# Patient Record
Sex: FEMALE | Race: WHITE | NOT HISPANIC OR LATINO | Employment: FULL TIME | URBAN - METROPOLITAN AREA
[De-identification: names, ages, dates, MRNs, and addresses within clinical notes are randomized per-mention and may not be internally consistent; named-entity substitution may affect disease eponyms.]

---

## 2018-05-02 ENCOUNTER — OFFICE VISIT (OUTPATIENT)
Dept: OPTOMETRY | Facility: CLINIC | Age: 58
End: 2018-05-02
Payer: COMMERCIAL

## 2018-05-02 DIAGNOSIS — H52.223 REGULAR ASTIGMATISM OF BOTH EYES: ICD-10-CM

## 2018-05-02 DIAGNOSIS — Z98.890 HX OF LASIK: ICD-10-CM

## 2018-05-02 DIAGNOSIS — H52.4 PRESBYOPIA: Primary | ICD-10-CM

## 2018-05-02 DIAGNOSIS — H52.03 HYPERMETROPIA OF BOTH EYES: ICD-10-CM

## 2018-05-02 PROCEDURE — 92004 COMPRE OPH EXAM NEW PT 1/>: CPT | Performed by: OPTOMETRIST

## 2018-05-02 PROCEDURE — 92015 DETERMINE REFRACTIVE STATE: CPT | Performed by: OPTOMETRIST

## 2018-05-02 ASSESSMENT — CONF VISUAL FIELD
METHOD: COUNTING FINGERS
OD_NORMAL: 1
OS_NORMAL: 1

## 2018-05-02 ASSESSMENT — VISUAL ACUITY
OD_CC: 20/20
OS_CC: 20/25
OS_SC: 20/40
CORRECTION_TYPE: GLASSES
OS_CC: 20/20
OD_CC: 20/20
METHOD: SNELLEN - LINEAR
OD_SC: 20/25

## 2018-05-02 ASSESSMENT — REFRACTION_MANIFEST
OS_SPHERE: +0.75
OS_CYLINDER: +0.25
OD_ADD: +2.25
OD_AXIS: 040
OD_SPHERE: +0.00
OD_CYLINDER: +0.50
OS_ADD: +2.25
OS_AXIS: 160

## 2018-05-02 ASSESSMENT — TONOMETRY
IOP_METHOD: TONOPEN
OD_IOP_MMHG: 16
OS_IOP_MMHG: 17

## 2018-05-02 ASSESSMENT — CUP TO DISC RATIO
OD_RATIO: 0.3
OS_RATIO: 0.2

## 2018-05-02 ASSESSMENT — REFRACTION_WEARINGRX
OS_AXIS: 072
OD_CYLINDER: -0.25
OS_SPHERE: +0.75
OS_CYLINDER: -0.25
SPECS_TYPE: PAL
OD_AXIS: 022
OD_SPHERE: +0.25

## 2018-05-02 ASSESSMENT — SLIT LAMP EXAM - LIDS
COMMENTS: NORMAL
COMMENTS: NORMAL

## 2018-05-02 ASSESSMENT — EXTERNAL EXAM - RIGHT EYE: OD_EXAM: NORMAL

## 2018-05-02 ASSESSMENT — EXTERNAL EXAM - LEFT EYE: OS_EXAM: NORMAL

## 2018-05-02 NOTE — LETTER
5/2/2018         RE: Bre Bush  [address]  [facility] [address]        Dear Colleague,    Thank you for referring your patient, Bre Bush, to the [facility]. Please see a copy of my visit note below.    Chief Complaint   Patient presents with     COMPREHENSIVE EYE EXAM         Last Eye Exam: [facility] - 5 years  Dilated Previously: Yes    What are you currently using to see?  Glasses - sometimes - if she needs to read something on tv       Distance Vision Acuity: Noticed gradual change in left eye    Near Vision Acuity: Not satisfied  - reaches for cheaters to help - +2.50    Eye Comfort: good  Do you use eye drops? : No  Occupation or Hobbies: Nurse at the [employer]    Sailaja Townsend  Optometric Tech            Medical, surgical and family histories reviewed and updated 5/2/2018.       OBJECTIVE: See Ophthalmology exam    ASSESSMENT:    ICD-10-CM    1. Presbyopia H52.4 REFRACTION   2. Regular astigmatism of both eyes H52.223 REFRACTION   3. 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 Hx of LASIK Z98.890 EYE EXAM (SIMPLE-NONBILLABLE)      PLAN:     Patient Instructions   Recommend new glasses.  Work space glasses are an option.    Discussed contact lens wear.  Monovision left eye lens only or multifocal.  Will call if interested in cl fit.    Return in 1 year for a complete eye exam or sooner if needed.    Tyler Miller, MARKY           Again, thank you for allowing me to participate in the care of your patient.        Sincerely,        Tyler Miller, OD

## 2018-05-02 NOTE — PATIENT INSTRUCTIONS
Recommend new glasses.  Work space glasses are an option.    Discussed contact lens wear.  Monovision left eye lens only or multifocal.  Will call if interested in cl fit.    Return in 1 year for a complete eye exam or sooner if needed.    Tyler Miller, MARKY      The affects of the dilating drops last for 4- 6 hours.  You will be more sensitive to light and vision will be blurry up close.  Mydriatic sunglasses were given if needed.      Optometry Providers       Clinic Locations                                 Telephone Number   Dr. Margoth Aquino [facility]   [place] [phone]     [facility] Hours:                [facility] Hours:       [facility] Hours:   [address]   [address]     [address] [facility] [place] [address]   [place] [address]    [place] [address]  Phone: [phone]                    Phone: [phone]     Phone: [phone]                      Monday 8:00-7:00                          Monday 8:00-7:00                          Monday 8:00-7:00              Tuesday 8:00-6:00                          Tuesday 8:00-7:00                          Tuesday 8:00-7:00              Wednesday 8:00-6:00                  Wednesday 8:00-7:00                   Wednesday 8:00-7:00      Thursday 8:00-6:00                        Thursday 8:00-7:00                         Thursday 8:00-7:00            Friday 8:00-5:00                              Friday 8:00-5:00                              Friday 8:00-5:00    [facility] Hours:   [address] [place], [place] [address] [phone]    Monday 8:00-7:00  Tuesday 8:00-7:00  Wednesday 8:00-7:00  Thursday 8:00-7:00  Friday 8:00-5:00  Please log on to [email or web address] to order your contact lenses.  The link is found on the [facility] Care [facility] page.  As always, Thank you for trusting us with  your health care needs!

## 2018-05-02 NOTE — MR AVS SNAPSHOT
After Visit Summary   5/2/2018    Bre Bush    MRN: [id number]           Patient Information     Date Of Birth          1960        Visit Information        Provider Department      5/2/2018 9:00 AM Tyler Miller OD [facility]        Today's Diagnoses     Presbyopia    -  1    Regular astigmatism of both eyes        Hypermetropia of both eyes        Hx of LASIK          Care Instructions    Recommend new glasses.  Work space glasses are an option.    Discussed contact lens wear.  Monovision left eye lens only or multifocal.  Will call if interested in cl fit.    Return in 1 year for a complete eye exam or sooner if needed.    Tyler Miller, MARKY      The affects of the dilating drops last for 4- 6 hours.  You will be more sensitive to light and vision will be blurry up close.  Mydriatic sunglasses were given if needed.      Optometry Providers       Clinic Locations                                 Telephone Number   Dr. Margoth Aquino [place]   [facility] [phone]     [facility] Hours:                [place] [place] [facility] Hours:       [facility] Hours:   [address]   [address]     [address] [place] [address]   [place] [address]    [place] [address]  Phone: [phone]                    Phone: [phone]     Phone: [phone]                      Monday 8:00-7:00                          Monday 8:00-7:00                          Monday 8:00-7:00              Tuesday 8:00-6:00                          Tuesday 8:00-7:00                          Tuesday 8:00-7:00              Wednesday 8:00-6:00                  Wednesday 8:00-7:00                   Wednesday 8:00-7:00      Thursday 8:00-6:00                        Thursday 8:00-7:00                         Thursday 8:00-7:00            Friday 8:00-5:00                               Friday 8:00-5:00                              Friday 8:00-5:00    [facility] Hours:   [address] [place] [address]  [phone]    Monday 8:00-7:00  Tuesday 8:00-7:00  Wednesday 8:00-7:00  Thursday 8:00-7:00  Friday 8:00-5:00  Please log on to [email or web address] to order your contact lenses.  The link is found on the Eye Care and Vision Services page.  As always, Thank you for trusting us with your health care needs!            Follow-ups after your visit        Follow-up notes from your care team     Return in about 1 year (around 5/2/2019) for Annual Visit.      Who to contact     If you have questions or need follow up information about today's clinic visit or your schedule please contact [facility] directly at [phone].  Normal or non-critical lab and imaging results will be communicated to you by MyChart, letter or phone within 4 business days after the clinic has received the results. If you do not hear from us within 7 days, please contact the clinic through YeHive[employer] or phone. If you have a critical or abnormal lab result, we will notify you by phone as soon as possible.  Submit refill requests through [email or web address] or call your pharmacy and they will forward the refill request to us. Please allow 3 business days for your refill to be completed.          Additional Information About Your Visit        Ye[employer]ive[employer] Information     [email or web address] is an electronic gateway that provides easy, online access to your medical records. With [email or web address], you can request a clinic appointment, read your test results, renew a prescription or communicate with your care team.     To sign up for [email or web address] visit the website at [email or web address]   You will be asked to enter the access code listed below, as well as some personal information. Please follow the directions to create your username and password.     Your access code is: [email or web address]  Expires: 7/31/2018 10:50 AM      Your access code will  in 90 days. If you need help or a new code, please contact your [facility] or call [phone] for assistance.        Care EveryWhere ID     This is your Care EveryWhere ID. This could be used by other organizations to access your [facility] medical records  [id number]         Blood Pressure from Last 3 Encounters:   No data found for BP    Weight from Last 3 Encounters:   No data found for Wt              We Performed the Following     EYE EXAM (SIMPLE-NONBILLABLE)     REFRACTION        Primary Care Provider Office Phone # Fax #    Ha MD Suman [phone]       [address]  [facility] [address]        Equal Access to Services     IVETTE EVERETT : Hadii zach escalona hadasho Somaximoali, waaxda luqadaha, qaybta kaalmada adeegyada, zeb sandoval. So [facility] [phone].    ATENCIÓN: Si habla español, tiene a marte disposición servicios gratuitos de asistencia lingüística. Llame al [phone].    We comply with applicable federal civil rights laws and [place] laws. We do not discriminate on the basis of race, color, national origin, age, disability, sex, sexual orientation, or gender identity.            Thank you!     Thank you for choosing [facility]  for your care. Our goal is always to provide you with excellent care. Hearing back from our patients is one way we can continue to improve our services. Please take a few minutes to complete the written survey that you may receive in the mail after your visit with us. Thank you!             Your Updated Medication List - Protect others around you: Learn how to safely use, store and throw away your medicines at www.disposemymeds.org.          This list is accurate as of 18 10:50 AM.  Always use your most recent med list.                   Brand Name Dispense Instructions for use Diagnosis     AMBIEN PO           PREVACID PO           SYNTHROID PO

## 2018-05-02 NOTE — PROGRESS NOTES
Chief Complaint   Patient presents with     COMPREHENSIVE EYE EXAM         Last Eye Exam: [facility]ano - 5 years  Dilated Previously: Yes    What are you currently using to see?  Glasses - sometimes - if she needs to read something on tv       Distance Vision Acuity: Noticed gradual change in left eye    Near Vision Acuity: Not satisfied  - reaches for cheaters to help - +2.50    Eye Comfort: good  Do you use eye drops? 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       OBJECTIVE: See Ophthalmology exam    ASSESSMENT:    ICD-10-CM    1. Presbyopia H52.4 REFRACTION   2. Regular astigmatism of both eyes H52.223 REFRACTION   3. 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 Hx of LASIK Z98.890 EYE EXAM (SIMPLE-NONBILLABLE)      PLAN:     Patient Instructions   Recommend new glasses.  Work space glasses are an option.    Discussed contact lens wear.  Monovision left eye lens only or multifocal.  Will call if interested in cl fit.    Return in 1 year for a complete eye exam or sooner if needed.    Tyler Miller, OD

## 2020-03-12 ENCOUNTER — THERAPY VISIT (OUTPATIENT)
Dept: OCCUPATIONAL THERAPY | Facility: CLINIC | Age: 60
End: 2020-03-12
Payer: COMMERCIAL

## 2020-03-12 DIAGNOSIS — M65.4 RADIAL STYLOID TENOSYNOVITIS (DE QUERVAIN): ICD-10-CM

## 2020-03-12 DIAGNOSIS — M79.644 BILATERAL THUMB PAIN: ICD-10-CM

## 2020-03-12 DIAGNOSIS — M79.645 BILATERAL THUMB PAIN: ICD-10-CM

## 2020-03-12 PROCEDURE — 97760 ORTHOTIC MGMT&TRAING 1ST ENC: CPT | Mod: GO | Performed by: OCCUPATIONAL THERAPIST

## 2020-03-12 PROCEDURE — 97110 THERAPEUTIC EXERCISES: CPT | Mod: GO | Performed by: OCCUPATIONAL THERAPIST

## 2020-03-12 PROCEDURE — 97165 OT EVAL LOW COMPLEX 30 MIN: CPT | Mod: GO | Performed by: OCCUPATIONAL THERAPIST

## 2020-03-12 NOTE — LETTER
[facility]  [address]  [place]   [facility] [address]  [phone]    2020    Re: Bre Bush   :   1960  MRN:  [id number]   REFERRING PHYSICIAN:   Ha Will    [facility]  Date of Initial Evaluation:  3/12/20  Visits:  Rxs Used: 1  Reason for Referral:     Bilateral thumb pain  Radial styloid tenosynovitis (de quervain)    EVALUATION SUMMARY  Hand Therapy Initial Evaluation  Current Date:  3/12/2020  Referring Physician: Dr. Will  Diagnosis: Bilateral Dequervains (R) greater than (L)  DOI: 20 (Date of order)    Subjective:  Bre Bush is a 60 year old right hand dominant female.    Patient reports symptoms of pain, stiffness/loss of motion, weakness/loss of strength, edema, numbness and tingling  of the bilateral hands/wrists which occurred due to unknown etiology. Since onset symptoms are Gradually getting worse.  Special tests:  x-ray.  Previous treatment: none.    General health as reported by patient is good.  Pertinent medical history includes:Thyroid Problems, reflux  Medical allergies:none.  Surgical history: orthopedic: (R) knee, (L) toe.  Medication history: Sleep, Thyroid, Prevacid.    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  Job Tasks: Computer Work, Prolonged Standing, Repetitive Tasks  Prior functional level:  no limitations  Barriers include:none  Mobility: No difficulty  Transportation: drives  Leisure activities/hobbies: golf    Upper Extremity Functional Index Score:  SCORE:   Column Totals: /80: 52   (A lower score indicates greater disability.)    Objective:  Pain Level (Scale 0-10):   3/12/2020   At Rest 4-5/10   With Use 7/10     Re: Bre Bush   :   1960    Pain Description:  Date 3/12/2020   Location hand and thumb   Pain Quality Aching, throbbing and Sharp   Frequency intermittent     Pain is worst  daytime   Exacerbated by   gripping, twisting, pinching, pulling, lifting   Relieved by NSAIDs and rest   Progression Gradually worsening     Sensation   Decreased Median Nerve distribution per pt report.  Patient reports numbness in the thumbs    Edema   - none  + mild    ++ moderate    +++ severe    3/12/2020   1st DC -   Radial Styloid -      ROM  Pain Report: - none  + mild    ++ moderate    +++ severe   Thumb   3/12/2020 3/12/2020   AROM  (PROM) Right Left   MP 60 53   IP 60 60   RABD 45+ 50+   PABD 45+ 50+   Opposition 7/10+++ 7/10++     Wrist 3/12/2020 3/12/2020   AROM (PROM) Right Left   Extension 70+ 75   Flexion 65+ 65+   RD 20+ 25   UD 45+ 45   UD with Th Flex 20++ 25+     Resisted Testing  Pain Report: - none  + mild    ++ moderate    +++ severe   Right 3/12/2020   APL ++   EPB +++   EPL +++   FCR ++   Radial Deviation ++   Ulnar Deviation +       Re: Bre Bush   :   1960    Resisted Testing  Pain Report: - none  + mild    ++ moderate    +++ severe   Left 3/12/2020   APL +   EPB +   EPL +   FCR +   Radial Deviation ++   Ulnar Deviation +         Strength   (Measured in pounds)  Pain Report: - none  + mild    ++ moderate    +++ severe    3/12/2020 3/12/2020   Trials Right Left   1  2  3 63++ 60++   Average       Lat Pinch 3/12/2020 3/12/2020   Trials Right Left   1  2  3 15+++ 19+   Average       3 Pt Pinch 3/12/2020 3/12/2020   Trials Right Left   1  2  3 6+++ 15+   Average       Special Tests   Pain Report:  - none    + mild    ++ moderate    +++ severe   Right 3/12/2020   Finkelsteins +++   Radial Nerve Tinel's (DRSN) -   Th CMC Grind ++   Th CMC Passive Retropulsion +++   ULTT Radial Nerve                      Re: Bre Bush   :   1960    Special Tests   Pain Report:  - none    + mild    ++ moderate    +++ severe   Left 3/12/2020   Finkelsteins +   Radial Nerve Tinel's (DRSN) +   Th CMC Grind +   Th CMC Passive Retropulsion ++   ULTT Radial Nerve      Palpation  Pain Report:  - none    + mild     ++ moderate    +++ severe   Right 3/12/2020   Radial Styloid +++   1st DC +++   FCR ++   Thumb CMC +++   PIN Site ++   Extensor Wad      Palpation  Pain Report:  - none    + mild    ++ moderate    +++ severe   Left 3/12/2020   Radial Styloid +++   1st DC +++   FCR ++   Thumb CMC ++   PIN Site ++   Extensor Wad -     Thumb Observation/Appearance  Key: + = present/ - = not observed    3/12/2020   Shoulder deformity present over CMC R:-  L:-   Volar subluxation present R:+  L:+   Edema over the CMC joint R:+  L:+   Noted collapse of MP into hyperextension during pinch R:-  L:+   Tenderness at CMC R:+++  L:++                          Re: Bre Bush   :   1960    Provocative Tests  Pain Report:  - none    + mild    ++ moderate    +++ severe     3/12/2020   CMC Grind test R:-  L:-   CMC Joint line/pain R:++  L:++   CMC AP joint laxity R:-  L: -   Crepitus present R:-  L:-       Assessment:  Patient presents with symptoms consistent with diagnosis of CMC thumb arthritis and Dequervains, with conservative intervention.    Patient s limitations or Problem List includes:  Pain, Decreased ROM/motion, weakness, decreased stability of the CMC joint, tightness in the musculature decreased  and pinch strength of the thumb which interferes with patients ability to perform  self care, dressing, home maintenance, work and sports/recreational as compared to previous level of function.    Rehab Potential:  Good - Return to full activity, some limitations    Patient will benefit from skilled Occupational Therapy to increase ROM, flexibility, overall strength,  strength, pinch strength and stability of thumb and decrease pain to return to previous activity level and resume normal daily tasks and to reach their rehab potential.    Barriers to Learning:  No barrier    Communication Issues:  Patient appears to be able to clearly communicate and understand verbal and written communication and follow directions  correctly.    Chart Review: Brief history including review of medical and/or therapy records relating to the presenting problem and Simple history review with patient    Identified Performance Deficits: dressing, home establishment and management, meal preparation and cleanup, work and leisure activities    Assessment of Occupational Performance:  5 or more Performance Deficits    Clinical Decision Making (Complexity): Low complexity    Treatment Explanation:  The following has been discussed with the patient:  RX ordered/plan of care  Anticipated outcomes  Possible risks and side effects    Plan:  Frequency:  1 X week, once daily  Duration:  for 8 weeks    Treatment Plan:  Modalities:  Paraffin, US  Therapeutic Exercise: AROM, Isometrics, and Stabilization exercises of the Thumb CMC, including active and resisted abduction, 1st DI strengthening  Manual Techniques: Joint Mobilization or reseating of the trapezium, self MFR to thumb adductor with clip, MFR to 1st dorsal compartment,Friction massage over 1st dorsal compartment  Re: Bre Bush   :   1960    Neuromuscular: Radial nerve gliding, Kevyn taping  Orthosis fabrication:  Forearm based thumb spica, Hand based Thumb Spica, Custom neoprene support   Education: Anatomy of CMC, joint protection principles, adaptive equipment as needed    Discharge Plan:  Achieve all LTG  [place] in home treatment program.  Reach maximal therapeutic benefit.    Home Program:  Warmth  Ice after activity for pain  Self TFM to 1st dorsal compartment  Self MFR to EPB/ABL  AROM of wrist and thumb  PROM with stretch into simultaneous thumb flexion and ulnar deviation  Thumb spica orthosis for sleeping and per symptoms day  Avoid activities that exacerbate pain in the wrist or thumb    Next Visit:  US?  1st web release with clip  Self CMC mobilization on chest  Place and hold pinch  Thumb Stabilization Program with hard  C  and index abd  Hand based Thumb Spica orthosis  night/sleeping and per symptoms during the day   CMC neoprene cool comfort orthosis during the day with ADL s per symptoms  Incorporate joint protection into daily functional activities  Adaptive equipment as needed.      Thank you for your referral.    INQUIRIES  Therapist: CAMERON Correa/L, T   [facility]  [address]  [place] [address]  [facility] [address]  Phone: [phone]  Fax: [phone]

## 2020-03-12 NOTE — PROGRESS NOTES
Hand Therapy Initial Evaluation    Current Date:  3/12/2020  Referring Physician: Dr. Will    Diagnosis: Bilateral Dequervains (R) greater than (L)  DOI: 2/28/20 (Date of order)    Subjective:  Bre Bush is a 60 year old right hand dominant female.    Patient reports symptoms of pain, stiffness/loss of motion, weakness/loss of strength, edema, numbness and tingling  of the bilateral hands/wrists which occurred due to unknown etiology. Since onset symptoms are Gradually getting worse.  Special tests:  x-ray.  Previous treatment: none.    General health as reported by patient is good.  Pertinent medical history includes:Thyroid Problems, reflux  Medical allergies:none.  Surgical history: orthopedic: (R) knee, (L) toe.  Medication history: Sleep, Thyroid, Prevacid.    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  Job Tasks: Computer Work, Prolonged Standing, Repetitive Tasks  Prior functional level:  no limitations  Barriers include:none  Mobility: No difficulty  Transportation: drives  Leisure activities/hobbies: [employer]    Upper Extremity Functional Index Score:  SCORE:   Column Totals: /80: 52   (A lower score indicates greater disability.)    Objective:  Pain Level (Scale 0-10):   3/12/2020   At Rest 4-5/10   With Use 7/10     Pain Description:  Date 3/12/2020   Location hand and thumb   Pain Quality Aching, throbbing and Sharp   Frequency intermittent     Pain is worst  daytime   Exacerbated by  gripping, twisting, pinching, pulling, lifting   Relieved by NSAIDs and rest   Progression Gradually worsening     Sensation   Decreased Median Nerve distribution per pt report.  Patient reports numbness in the thumbs    Edema   - none  + mild    ++ moderate    +++ severe    3/12/2020   1st DC -   Radial Styloid -      ROM  Pain Report: - none  + mild    ++ moderate    +++ severe   Thumb   3/12/2020 3/12/2020   AROM  (PROM) Right Left   MP 60 53    IP 60 60   RABD 45+ 50+   PABD 45+ 50+   Opposition 7/10+++ 7/10++     Wrist 3/12/2020 3/12/2020   AROM (PROM) Right Left   Extension 70+ 75   Flexion 65+ 65+   RD 20+ 25   UD 45+ 45   UD with Th Flex 20++ 25+       Resisted Testing  Pain Report: - none  + mild    ++ moderate    +++ severe   Right 3/12/2020   APL ++   EPB +++   EPL +++   FCR ++   Radial Deviation ++   Ulnar Deviation +         Resisted Testing  Pain Report: - none  + mild    ++ moderate    +++ severe   Left 3/12/2020   APL +   EPB +   EPL +   FCR +   Radial Deviation ++   Ulnar Deviation +         Strength   (Measured in pounds)  Pain Report: - none  + mild    ++ moderate    +++ severe    3/12/2020 3/12/2020   Trials Right Left   1  2  3 63++ 60++   Average       Lat Pinch 3/12/2020 3/12/2020   Trials Right Left   1  2  3 15+++ 19+   Average       3 Pt Pinch 3/12/2020 3/12/2020   Trials Right Left   1  2  3 6+++ 15+   Average       Special Tests   Pain Report:  - none    + mild    ++ moderate    +++ severe   Right 3/12/2020   Finkelsteins +++   Radial Nerve Tinel's (DRSN) -   Th CMC Grind ++   Th CMC Passive Retropulsion +++   ULTT Radial Nerve      Special Tests   Pain Report:  - none    + mild    ++ moderate    +++ severe   Left 3/12/2020   Finkelsteins +   Radial Nerve Tinel's (DRSN) +   Th CMC Grind +   Th CMC Passive Retropulsion ++   ULTT Radial Nerve      Palpation  Pain Report:  - none    + mild    ++ moderate    +++ severe   Right 3/12/2020   Radial Styloid +++   1st DC +++   FCR ++   Thumb CMC +++   PIN Site ++   Extensor Wad      Palpation  Pain Report:  - none    + mild    ++ moderate    +++ severe   Left 3/12/2020   Radial Styloid +++   1st DC +++   FCR ++   Thumb CMC ++   PIN Site ++   Extensor Wad -       Thumb Observation/Appearance  Key: + = present/ - = not observed    3/12/2020   Shoulder deformity present over CMC R:-  L:-   Volar subluxation present R:+  L:+   Edema over the CMC joint R:+  L:+   Noted collapse of MP into  hyperextension during pinch R:-  L:+   Tenderness at CMC R:+++  L:++      Provocative Tests  Pain Report:  - none    + mild    ++ moderate    +++ severe     3/12/2020   CMC Grind test R:-  L:-   CMC Joint line/pain R:++  L:++   CMC AP joint laxity R:-  L: -   Crepitus present R:-  L:-       Assessment:  Patient presents with symptoms consistent with diagnosis of CMC thumb arthritis and Dequervains, with conservative intervention.    Patient s limitations or Problem List includes:  Pain, Decreased ROM/motion, weakness, decreased stability of the CMC joint, tightness in the musculature decreased  and pinch strength of the thumb which interferes with patients ability to perform  self care, dressing, home maintenance, work and sports/recreational as compared to previous level of function.    Rehab Potential:  Good - Return to full activity, some limitations    Patient will benefit from skilled Occupational Therapy to increase ROM, flexibility, overall strength,  strength, pinch strength and stability of thumb and decrease pain to return to previous activity level and resume normal daily tasks and to reach their rehab potential.    Barriers to Learning:  No barrier    Communication Issues:  Patient appears to be able to clearly communicate and understand verbal and written communication and follow directions correctly.    Chart Review: Brief history including review of medical and/or therapy records relating to the presenting problem and Simple history review with patient    Identified Performance Deficits: dressing, home establishment and management, meal preparation and cleanup, work and leisure activities    Assessment of Occupational Performance:  5 or more Performance Deficits    Clinical Decision Making (Complexity): Low complexity    Treatment Explanation:  The following has been discussed with the patient:  RX ordered/plan of care  Anticipated outcomes  Possible risks and side effects    Plan:  Frequency:   1 X week, once daily  Duration:  for 8 weeks    Treatment Plan:  Modalities:  Paraffin, US  Therapeutic Exercise: AROM, Isometrics, and Stabilization exercises of the Thumb CMC, including active and resisted abduction, 1st DI strengthening  Manual Techniques: Joint Mobilization or reseating of the trapezium, self MFR to thumb adductor with clip, MFR to 1st dorsal compartment,Friction massage over 1st dorsal compartment  Neuromuscular: Radial nerve gliding, Kevyn taping  Orthosis fabrication:  Forearm based thumb spica, Hand based Thumb Spica, Custom neoprene support   Education: Anatomy of CMC, joint protection principles, adaptive equipment as needed    Discharge Plan:  Achieve all LTG  [place] in home treatment program.  Reach maximal therapeutic benefit.    Home Program:  Warmth  Ice after activity for pain  Self TFM to 1st dorsal compartment  Self MFR to EPB/ABL  AROM of wrist and thumb  PROM with stretch into simultaneous thumb flexion and ulnar deviation  Thumb spica orthosis for sleeping and per symptoms day  Avoid activities that exacerbate pain in the wrist or thumb    Next Visit:  US?  1st web release with clip  Self CMC mobilization on chest  Place and hold pinch  Thumb Stabilization Program with hard  C  and index abd  Hand based Thumb Spica orthosis night/sleeping and per symptoms during the day   CMC neoprene cool comfort orthosis during the day with ADL s per symptoms  Incorporate joint protection into daily functional activities  Adaptive equipment as needed.

## 2020-03-17 ENCOUNTER — THERAPY VISIT (OUTPATIENT)
Dept: OCCUPATIONAL THERAPY | Facility: CLINIC | Age: 60
End: 2020-03-17
Payer: COMMERCIAL

## 2020-03-17 DIAGNOSIS — M79.645 BILATERAL THUMB PAIN: ICD-10-CM

## 2020-03-17 DIAGNOSIS — M65.4 RADIAL STYLOID TENOSYNOVITIS (DE QUERVAIN): ICD-10-CM

## 2020-03-17 DIAGNOSIS — M79.644 BILATERAL THUMB PAIN: ICD-10-CM

## 2020-03-17 PROCEDURE — 97110 THERAPEUTIC EXERCISES: CPT | Mod: GO | Performed by: OCCUPATIONAL THERAPIST

## 2020-03-17 PROCEDURE — 97035 APP MDLTY 1+ULTRASOUND EA 15: CPT | Mod: GO | Performed by: OCCUPATIONAL THERAPIST

## 2020-03-17 PROCEDURE — 97112 NEUROMUSCULAR REEDUCATION: CPT | Mod: GO | Performed by: OCCUPATIONAL THERAPIST

## 2020-03-17 PROCEDURE — 97140 MANUAL THERAPY 1/> REGIONS: CPT | Mod: GO | Performed by: OCCUPATIONAL THERAPIST

## 2020-04-09 ENCOUNTER — TELEPHONE (OUTPATIENT)
Dept: OCCUPATIONAL THERAPY | Facility: CLINIC | Age: 60
End: 2020-04-09

## 2020-07-13 PROBLEM — M79.645 BILATERAL THUMB PAIN: Status: RESOLVED | Noted: 2020-03-12 | Resolved: 2020-07-13

## 2020-07-13 PROBLEM — M65.4 RADIAL STYLOID TENOSYNOVITIS (DE QUERVAIN): Status: RESOLVED | Noted: 2020-03-12 | Resolved: 2020-07-13

## 2020-07-13 PROBLEM — M79.644 BILATERAL THUMB PAIN: Status: RESOLVED | Noted: 2020-03-12 | Resolved: 2020-07-13

## 2020-07-13 NOTE — PROGRESS NOTES
Discharge Summary - Hand Therapy    Patient did not return to therapy.  Please refer to the last progress/SOAP note for objective details and goal achievement.  We will assume that patient's goals were met.    D/C from [facility].

## 2020-10-01 ENCOUNTER — OFFICE VISIT (OUTPATIENT)
Dept: ORTHOPEDICS | Facility: CLINIC | Age: 60
End: 2020-10-01
Payer: COMMERCIAL

## 2020-10-01 VITALS
HEART RATE: 78 BPM | HEIGHT: 65 IN | BODY MASS INDEX: 34.69 KG/M2 | WEIGHT: 208.2 LBS | OXYGEN SATURATION: 98 % | SYSTOLIC BLOOD PRESSURE: 160 MMHG | DIASTOLIC BLOOD PRESSURE: 96 MMHG

## 2020-10-01 DIAGNOSIS — M65.4 DE QUERVAIN'S DISEASE (RADIAL STYLOID TENOSYNOVITIS): Primary | ICD-10-CM

## 2020-10-01 PROCEDURE — 20550 NJX 1 TENDON SHEATH/LIGAMENT: CPT | Mod: RT | Performed by: FAMILY MEDICINE

## 2020-10-01 PROCEDURE — 99207 PR DROP WITH A PROCEDURE: CPT | Performed by: FAMILY MEDICINE

## 2020-10-01 RX ORDER — METHYLPREDNISOLONE ACETATE 40 MG/ML
40 INJECTION, SUSPENSION INTRA-ARTICULAR; INTRALESIONAL; INTRAMUSCULAR; SOFT TISSUE
Status: DISCONTINUED | OUTPATIENT
Start: 2020-10-01 | End: 2021-02-26

## 2020-10-01 RX ADMIN — METHYLPREDNISOLONE ACETATE 40 MG: 40 INJECTION, SUSPENSION INTRA-ARTICULAR; INTRALESIONAL; INTRAMUSCULAR; SOFT TISSUE at 13:57

## 2020-10-01 ASSESSMENT — PAIN SCALES - GENERAL: PAINLEVEL: EXTREME PAIN (8)

## 2020-10-01 ASSESSMENT — MIFFLIN-ST. JEOR: SCORE: 1503.39

## 2020-10-01 NOTE — LETTER
"    10/1/2020         RE: Bre Bush  [address]  [facility] [address]        Dear Colleague,    Thank you for referring your patient, Bre Bush, to the [facility] [place]. Please see a copy of my visit note below.    Hand / Upper Extremity Injection/Arthrocentesis: R extensor compartment 1    Date/Time: 10/1/2020 1:57 PM  Performed by: Stas Cedeno DO  Authorized by: Stas Cedeno DO     Indications:  Pain  Needle Size:  22 G  Guidance: ultrasound    Condition: de Quervain's      Site:  R extensor compartment 1  Medications:  40 mg methylPREDNISolone 40 MG/ML  Outcome:  Tolerated well, no immediate complications  Procedure discussed: discussed risks, benefits, and alternatives    Consent Given by:  Patient  Timeout: timeout called immediately prior to procedure    Prep: patient was prepped and draped in usual sterile fashion       Wrist Injection, Dorsal First Compartment - Ultrasound Guided    The patient was informed of the risks and the benefits of the procedure and a written consent was signed.    The patient s right wrist was prepped with chlorhexidine in sterile fashion.   40 mg of methylprednisolone suspension was drawn up into a 3 mL syringe with 1.0 mL of 1% lidocaine.  Injection was performed using sterile technique.  Under ultrasound guidance a 1.5\" 22-gauge needle was used to enter the right wrist at the dorsal first compartment.  Needle placement was visualized and documented with ultrasound.  Ultrasound visualization required to ensure injection material enters the tendon sheath and not the tendon itself.  Injection performed long axis to the probe.  Injection solution visualized within the tendon sheath.  Images were permanently stored for the patient's record.  There were no complications. The patient tolerated the procedure well. There was negligible bleeding.   The patient was instructed to ice the wrist upon leaving clinic and " "refrain from overuse over the next 3 days.   The patient was instructed to call or go to the emergency room with any unusual pain, swelling, redness, or if otherwise concerned.          CHIEF COMPLAINT:  New Patient (Procedure injection- pain in both thumbs)       HISTORY OF PRESENT ILLNESS  Ms. Bush is a pleasant 60 year old female who presents to clinic today with wrist pain.  Bre has had pain in both of her wrist for some time, possibly years.  While there was no single inciting event, she does suspect that this is the cumulative effect of years using her hands.  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  Her pain is now affecting her ADLs as well.  She has tried analgesics, hand therapy, and a custom brace.      Additional history: as documented    MEDICAL HISTORY  Patient Active Problem List   Diagnosis     Knee pain     Other postprocedural status(V45.89)       Current Outpatient Medications   Medication Sig Dispense Refill     Lansoprazole (PREVACID PO)        Levothyroxine Sodium (SYNTHROID PO)        Zolpidem Tartrate (AMBIEN PO)          No Known Allergies    Family History   Problem Relation Age of Onset     Glaucoma No family hx of      Macular Degeneration No family hx of        Additional medical/Social/Surgical histories reviewed in [facility] and updated as appropriate.        PHYSICAL EXAM    Vitals:    10/01/20 1330   BP: (!) 160/96   BP Location: Right arm   Patient Position: Sitting   Cuff Size: Adult Regular   Pulse: 78   SpO2: 98%   Weight: 94.4 kg (208 lb 3.2 oz)   Height: 1.64 m (5' 4.57\")     General  - normal appearance, in no obvious distress  HEENT  - conjunctivae not injected, moist mucous membranes  CV  - normal radial pulse  Pulm  - normal respiratory pattern, non-labored  Musculoskeletal - right wrist  - inspection: no atrophy, normal joint alignment, no swelling  - palpation: " mild tenderness over the radiocarpal joint, no bony or soft tissue tenderness, no tenderness at the anatomical snuffbox  - ROM:  90 deg flexion   70 deg extension   25 deg abduction   65 deg adduction  - strength: 5/5  strength, 5/5 wrist abduction, 5/5 flexion, extension, pronation, supination, adduction  - special tests:  (-) Tinel's  (+) Finkelstein  Neuro  - no numbness, no motor deficit, grossly normal coordination, normal muscle tone  Skin  - no ecchymosis, erythema, warmth, or induration, no obvious rash  Psych  - interactive, appropriate, normal mood and affect         ASSESSMENT & PLAN  Ms. Bush is a 60 year old female who presents to clinic today with bilateral wrist pain.    Her symptoms do seem more than likely secondary to deQuervain's.    Through shared decision making we did decide to inject her wrist today (see procedure note).  I do suggest Bre try Voltaren gel and continue exercises and her brace for comfort.    If this injection does not help I would consider ordering an MR.    It was a pleasure seeing Bre today.    Stas Cedeno DO, [facility]  Primary Care Sports Medicine      This note was constructed using Dragon dictation software, please excuse any minor errors in spelling, grammar, or syntax.          Again, thank you for allowing me to participate in the care of your patient.        Sincerely,        Stas Cedeno DO

## 2020-10-01 NOTE — PROGRESS NOTES
"Hand / Upper Extremity Injection/Arthrocentesis: R extensor compartment 1    Date/Time: 10/1/2020 1:57 PM  Performed by: Stas Cedeno DO  Authorized by: Stas Cedeno DO     Indications:  Pain  Needle Size:  22 G  Guidance: ultrasound    Condition: de Quervain's      Site:  R extensor compartment 1  Medications:  40 mg methylPREDNISolone 40 MG/ML  Outcome:  Tolerated well, no immediate complications  Procedure discussed: discussed risks, benefits, and alternatives    Consent Given by:  Patient  Timeout: timeout called immediately prior to procedure    Prep: patient was prepped and draped in usual sterile fashion       Wrist Injection, Dorsal First Compartment - Ultrasound Guided    The patient was informed of the risks and the benefits of the procedure and a written consent was signed.    The patient s right wrist was prepped with chlorhexidine in sterile fashion.   40 mg of methylprednisolone suspension was drawn up into a 3 mL syringe with 1.0 mL of 1% lidocaine.  Injection was performed using sterile technique.  Under ultrasound guidance a 1.5\" 22-gauge needle was used to enter the right wrist at the dorsal first compartment.  Needle placement was visualized and documented with ultrasound.  Ultrasound visualization required to ensure injection material enters the tendon sheath and not the tendon itself.  Injection performed long axis to the probe.  Injection solution visualized within the tendon sheath.  Images were permanently stored for the patient's record.  There were no complications. The patient tolerated the procedure well. There was negligible bleeding.   The patient was instructed to ice the wrist upon leaving clinic and refrain from overuse over the next 3 days.   The patient was instructed to call or go to the emergency room with any unusual pain, swelling, redness, or if otherwise concerned.          CHIEF COMPLAINT:  New Patient (Procedure injection- pain in both thumbs)       HISTORY OF " "PRESENT ILLNESS  Ms. Bush is a pleasant 60 year old female who presents to clinic today with wrist pain.  Bre has had pain in both of her wrist for some time, possibly years.  While there was no single inciting event, she does suspect that this is the cumulative effect of years using her hands.  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  Her pain is now affecting her ADLs as well.  She has tried analgesics, hand therapy, and a custom brace.      Additional history: as documented    MEDICAL HISTORY  Patient Active Problem List   Diagnosis     Knee pain     Other postprocedural status(V45.89)       Current Outpatient Medications   Medication Sig Dispense Refill     Lansoprazole (PREVACID PO)        Levothyroxine Sodium (SYNTHROID PO)        Zolpidem Tartrate (AMBIEN PO)          No Known Allergies    Family History   Problem Relation Age of Onset     Glaucoma No family hx of      Macular Degeneration No family hx of        Additional medical/Social/Surgical histories reviewed in [facility] and updated as appropriate.        PHYSICAL EXAM    Vitals:    10/01/20 1330   BP: (!) 160/96   BP Location: Right arm   Patient Position: Sitting   Cuff Size: Adult Regular   Pulse: 78   SpO2: 98%   Weight: 94.4 kg (208 lb 3.2 oz)   Height: 1.64 m (5' 4.57\")     General  - normal appearance, in no obvious distress  HEENT  - conjunctivae not injected, moist mucous membranes  CV  - normal radial pulse  Pulm  - normal respiratory pattern, non-labored  Musculoskeletal - right wrist  - inspection: no atrophy, normal joint alignment, no swelling  - palpation: mild tenderness over the radiocarpal joint, no bony or soft tissue tenderness, no tenderness at the anatomical snuffbox  - ROM:  90 deg flexion   70 deg extension   25 deg abduction   65 deg adduction  - strength: 5/5  strength, 5/5 wrist abduction, 5/5 flexion, extension, " pronation, supination, adduction  - special tests:  (-) Tinel's  (+) Finkelstein  Neuro  - no numbness, no motor deficit, grossly normal coordination, normal muscle tone  Skin  - no ecchymosis, erythema, warmth, or induration, no obvious rash  Psych  - interactive, appropriate, normal mood and affect         ASSESSMENT & PLAN  Ms. Bush is a 60 year old female who presents to clinic today with bilateral wrist pain.    Her symptoms do seem more than likely secondary to deQuervain's.    Through shared decision making we did decide to inject her wrist today (see procedure note).  I do suggest Bre try Voltaren gel and continue exercises and her brace for comfort.    If this injection does not help I would consider ordering an MR.    It was a pleasure seeing Bre today.    Stas Cedeno DO, [facility]  Primary Care Sports Medicine      This note was constructed using Dragon dictation software, please excuse any minor errors in spelling, grammar, or syntax.

## 2020-10-01 NOTE — PATIENT INSTRUCTIONS
Thanks for coming today.  Ortho/Sports Medicine Clinic  [address] [place] [address]    To schedule future appointments in Ortho Clinic, you may call [phone].    To schedule ordered imaging by your provider:   Call Central Imaging [phone]    To schedule an injection ordered by your provider:  Call Central Imaging Injection scheduling line: [phone]  [facility] available online at:  [email or web address]    Please call if any further questions or concerns ([phone]).  Clinic hours 8 am to 5 pm.    Return to clinic (call) if symptoms worsen or fail to improve.

## 2020-10-01 NOTE — NURSING NOTE
"Bre Bush's chief complaint for this visit includes:  Chief Complaint   Patient presents with     New Patient     Procedure injection- pain in both thumbs     PCP: Ha Will    Referring Provider:  No referring provider defined for this encounter.    BP (!) 160/96 (BP Location: Right arm, Patient Position: Sitting, Cuff Size: Adult Regular)   Pulse 78   Ht 1.64 m (5' 4.57\")   Wt 94.4 kg (208 lb 3.2 oz)   SpO2 98%   BMI 35.11 kg/m    Extreme Pain (8)     Do you need any medication refills at today's visit? No      "

## 2020-10-15 ENCOUNTER — OFFICE VISIT (OUTPATIENT)
Dept: ORTHOPEDICS | Facility: CLINIC | Age: 60
End: 2020-10-15
Payer: COMMERCIAL

## 2020-10-15 VITALS — WEIGHT: 208 LBS | BODY MASS INDEX: 35.08 KG/M2

## 2020-10-15 DIAGNOSIS — M65.4 DE QUERVAIN'S DISEASE (RADIAL STYLOID TENOSYNOVITIS): Primary | ICD-10-CM

## 2020-10-15 PROCEDURE — 99207 PR DROP WITH A PROCEDURE: CPT | Performed by: FAMILY MEDICINE

## 2020-10-15 PROCEDURE — 20550 NJX 1 TENDON SHEATH/LIGAMENT: CPT | Mod: LT | Performed by: FAMILY MEDICINE

## 2020-10-15 RX ORDER — TRIAMCINOLONE ACETONIDE 40 MG/ML
40 INJECTION, SUSPENSION INTRA-ARTICULAR; INTRAMUSCULAR
Status: DISCONTINUED | OUTPATIENT
Start: 2020-10-15 | End: 2021-02-26

## 2020-10-15 RX ADMIN — TRIAMCINOLONE ACETONIDE 40 MG: 40 INJECTION, SUSPENSION INTRA-ARTICULAR; INTRAMUSCULAR at 08:14

## 2020-10-15 NOTE — PROGRESS NOTES
Bre is here for a left dorsal 1st compartment injection.    Scribed by Daysi Moncada ATC for Dr. Cedeno on 10/15/20 at 8:20 AM, based on the providers statements to me.       [facility] FOLLOW-UP VISIT 10/15/2020    Bre Bush's chief complaint for this visit includes:  Chief Complaint   Patient presents with     RECHECK     f/u right first doral compartment injection, doing about 50% better, here for the left first dorsal compartment.      PCP: Ha Will    Referring Provider:  No referring provider defined for this encounter.    Wt 94.3 kg (208 lb)   BMI 35.08 kg/m    Data Unavailable       Interval History:     Follow up reason: right first dorsal compartment injection doing about 50% better, here today for the left.     Following Therapeutic Plan: Yes     Pain: Improving    Function: Improving    Medical History:    Any recent changes to your medical history? No    Any new medication prescribed since last visit? No    Review of Systems:    Do you have fever, chills, weight loss? No    Do you have any vision problems? No    Do you have any chest pain or edema? No    Do you have any shortness of breath or wheezing?  No    Do you have stomach problems? No    Do you have any urinary track issues? No    Do you have any numbness or focal weakness? No    Do you have diabetes? No    Do you have problems with bleeding or clotting? No    Do you have an rashes or other skin lesions? No    Hand / Upper Extremity Injection/Arthrocentesis: L extensor compartment 1    Date/Time: 10/15/2020 8:14 AM  Performed by: Stas Cedeno DO  Authorized by: Stas Cedeno DO     Indications:  Pain  Needle Size:  22 G  Guidance: ultrasound    Condition: de Quervain's      Site:  L extensor compartment 1  Medications:  40 mg triamcinolone 40 MG/ML  Outcome:  Tolerated well, no immediate complications  Procedure discussed: discussed risks, benefits, and alternatives    Consent Given by:  Patient  Timeout:  "timeout called immediately prior to procedure    Prep: patient was prepped and draped in usual sterile fashion       Wrist Injection, Dorsal First Compartment - Ultrasound Guided    The patient was informed of the risks and the benefits of the procedure and a written consent was signed.    The patient s left wrist was prepped with chlorhexidine in sterile fashion.   40 mg of methylprednisolone suspension was drawn up into a 3 mL syringe with 1.0 mL of 1% lidocaine.  Injection was performed using sterile technique.  Under ultrasound guidance a 1.5\" 22-gauge needle was used to enter the left wrist at the dorsal first compartment.  Needle placement was visualized and documented with ultrasound.  Ultrasound visualization required to ensure injection material enters the tendon sheath and not the tendon itself.  Injection performed long axis to the probe.  Injection solution visualized within the tendon sheath.  Images were permanently stored for the patient's record.  There were no complications. The patient tolerated the procedure well. There was negligible bleeding.   The patient was instructed to ice the wrist upon leaving clinic and refrain from overuse over the next 3 days.   The patient was instructed to call or go to the emergency room with any unusual pain, swelling, redness, or if otherwise concerned.           "

## 2020-10-15 NOTE — LETTER
10/15/2020         RE: Bre Bush  [address]  [facility] [address]        Dear Colleague,    Thank you for referring your patient, Bre Bush, to the [facility] [place]. Please see a copy of my visit note below.    Bre is here for a left dorsal 1st compartment injection.    Scribed by Daysi Moncada ATC for Dr. Cedeno on 10/15/20 at 8:20 AM, based on the providers statements to me.       [facility] FOLLOW-UP VISIT 10/15/2020    Bre Bush's chief complaint for this visit includes:  Chief Complaint   Patient presents with     RECHECK     f/u right first doral compartment injection, doing about 50% better, here for the left first dorsal compartment.      PCP: Ha Will    Referring Provider:  No referring provider defined for this encounter.    Wt 94.3 kg (208 lb)   BMI 35.08 kg/m    Data Unavailable       Interval History:     Follow up reason: right first dorsal compartment injection doing about 50% better, here today for the left.     Following Therapeutic Plan: Yes     Pain: Improving    Function: Improving    Medical History:    Any recent changes to your medical history? No    Any new medication prescribed since last visit? No    Review of Systems:    Do you have fever, chills, weight loss? No    Do you have any vision problems? No    Do you have any chest pain or edema? No    Do you have any shortness of breath or wheezing?  No    Do you have stomach problems? No    Do you have any urinary track issues? No    Do you have any numbness or focal weakness? No    Do you have diabetes? No    Do you have problems with bleeding or clotting? No    Do you have an rashes or other skin lesions? No    Hand / Upper Extremity Injection/Arthrocentesis: L extensor compartment 1    Date/Time: 10/15/2020 8:14 AM  Performed by: Stas Cedeno DO  Authorized by: Stas Cedeno DO     Indications:  Pain  Needle Size:  22 G  Guidance:  "ultrasound    Condition: de Quervain's      Site:  L extensor compartment 1  Medications:  40 mg triamcinolone 40 MG/ML  Outcome:  Tolerated well, no immediate complications  Procedure discussed: discussed risks, benefits, and alternatives    Consent Given by:  Patient  Timeout: timeout called immediately prior to procedure    Prep: patient was prepped and draped in usual sterile fashion       Wrist Injection, Dorsal First Compartment - Ultrasound Guided    The patient was informed of the risks and the benefits of the procedure and a written consent was signed.    The patient s left wrist was prepped with chlorhexidine in sterile fashion.   40 mg of methylprednisolone suspension was drawn up into a 3 mL syringe with 1.0 mL of 1% lidocaine.  Injection was performed using sterile technique.  Under ultrasound guidance a 1.5\" 22-gauge needle was used to enter the left wrist at the dorsal first compartment.  Needle placement was visualized and documented with ultrasound.  Ultrasound visualization required to ensure injection material enters the tendon sheath and not the tendon itself.  Injection performed long axis to the probe.  Injection solution visualized within the tendon sheath.  Images were permanently stored for the patient's record.  There were no complications. The patient tolerated the procedure well. There was negligible bleeding.   The patient was instructed to ice the wrist upon leaving clinic and refrain from overuse over the next 3 days.   The patient was instructed to call or go to the emergency room with any unusual pain, swelling, redness, or if otherwise concerned.               Again, thank you for allowing me to participate in the care of your patient.        Sincerely,        Stas Cedeno DO    "

## 2020-11-25 ENCOUNTER — ANCILLARY PROCEDURE (OUTPATIENT)
Dept: MRI IMAGING | Facility: CLINIC | Age: 60
End: 2020-11-25
Attending: FAMILY MEDICINE
Payer: COMMERCIAL

## 2020-11-25 DIAGNOSIS — M65.4 DE QUERVAIN'S DISEASE (RADIAL STYLOID TENOSYNOVITIS): ICD-10-CM

## 2020-11-25 PROCEDURE — 73221 MRI JOINT UPR EXTREM W/O DYE: CPT | Mod: RT | Performed by: RADIOLOGY

## 2020-11-25 PROCEDURE — 73221 MRI JOINT UPR EXTREM W/O DYE: CPT | Mod: LT | Performed by: RADIOLOGY

## 2020-12-01 ENCOUNTER — MYC MEDICAL ADVICE (OUTPATIENT)
Dept: ORTHOPEDICS | Facility: CLINIC | Age: 60
End: 2020-12-01

## 2020-12-01 DIAGNOSIS — M65.4 DE QUERVAIN'S DISEASE (RADIAL STYLOID TENOSYNOVITIS): Primary | ICD-10-CM

## 2020-12-02 NOTE — TELEPHONE ENCOUNTER
12/2 Called and spoke to patient. She is currently scheduled with Dr. Matthew.     Debbie Aceves   Procedure    Ortho/Sports Med/Pod/Ent/Eye  [facility]   [phone]

## 2020-12-02 NOTE — TELEPHONE ENCOUNTER
RECORDS RECEIVED FROM: bilateral hands referred by Dr. Cedeno   DATE RECEIVED: Jan 5, 2021     NOTES STATUS DETAILS   OFFICE NOTE from referring provider Internal    OFFICE NOTE from other specialist N/A    DISCHARGE SUMMARY from hospital N/A    DISCHARGE REPORT from the ER N/A    OPERATIVE REPORT N/A    MEDICATION LIST Internal    IMPLANT RECORD/STICKER N/A    LABS     CBC/DIFF N/A    CULTURES N/A    INJECTIONS DONE IN RADIOLOGY N/A    MRI Internal    CT SCAN N/A    XRAYS (IMAGES & REPORTS) N/A    TUMOR     PATHOLOGY  Slides & report N/A    12/02/20   12:53 PM   Complete  Sadia Almanza, REKHA

## 2021-01-02 ASSESSMENT — ENCOUNTER SYMPTOMS
STIFFNESS: 1
ARTHRALGIAS: 1
BACK PAIN: 0
MYALGIAS: 0
JOINT SWELLING: 0
MUSCLE WEAKNESS: 0
NECK PAIN: 0
MUSCLE CRAMPS: 0

## 2021-01-05 ENCOUNTER — PRE VISIT (OUTPATIENT)
Dept: ORTHOPEDICS | Facility: CLINIC | Age: 61
End: 2021-01-05

## 2021-01-05 ENCOUNTER — OFFICE VISIT (OUTPATIENT)
Dept: ORTHOPEDICS | Facility: CLINIC | Age: 61
End: 2021-01-05
Payer: COMMERCIAL

## 2021-01-05 ENCOUNTER — HOSPITAL ENCOUNTER (OUTPATIENT)
Facility: AMBULATORY SURGERY CENTER | Age: 61
End: 2021-01-05
Attending: PLASTIC SURGERY
Payer: COMMERCIAL

## 2021-01-05 DIAGNOSIS — M18.0 ARTHRITIS OF CARPOMETACARPAL (CMC) JOINTS OF BOTH THUMBS: Primary | ICD-10-CM

## 2021-01-05 DIAGNOSIS — M65.4 DE QUERVAIN'S DISEASE (RADIAL STYLOID TENOSYNOVITIS): ICD-10-CM

## 2021-01-05 DIAGNOSIS — M18.0 ARTHRITIS OF CARPOMETACARPAL (CMC) JOINTS OF BOTH THUMBS: ICD-10-CM

## 2021-01-05 PROCEDURE — 99203 OFFICE O/P NEW LOW 30 MIN: CPT | Performed by: PLASTIC SURGERY

## 2021-01-05 RX ORDER — CEFAZOLIN SODIUM 1 G/50ML
1 INJECTION, SOLUTION INTRAVENOUS SEE ADMIN INSTRUCTIONS
Status: CANCELLED | OUTPATIENT
Start: 2021-01-05

## 2021-01-05 RX ORDER — CEFAZOLIN SODIUM 2 G/50ML
2 SOLUTION INTRAVENOUS
Status: CANCELLED | OUTPATIENT
Start: 2021-01-05

## 2021-01-05 NOTE — LETTER
2021         RE: Bre Bush  [address]  [facility] [address]        Dear Colleague,    Thank you for referring your patient, Bre Bush, to the [facility] [place]. Please see a copy of my visit note below.    REFERRING PROVIDER: Stas Cedeno    REASON FOR CONSULTATION: Bilateral de Quervain's tendinitis.    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 Stas Cedeno for evaluation and possible surgical management of bilateral de Quervain's tendinitis.  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  She has noticed radial sided wrist pain throughout her career, but it acutely worsened over the last 1 year.  She has been seeing Dr. Cedeno for this and has also seen hand therapy.  She has tried splinting, exercises, and a steroid injection to bilateral wrists about 2 to 3 months ago in the region of the first dorsal compartment which was ultrasound-guided.  Her symptoms recurred within weeks after these injections.  Her symptoms are now affecting her daily and affecting her work as well.  Her right side is worse than the left.  Pain is sharp in nature and radiates proximally.    MEDS:   Prior to Admission medications    Medication Sig Start Date End Date Taking? Authorizing Provider   Lansoprazole (PREVACID PO)    Yes Reported, Patient   Levothyroxine Sodium (SYNTHROID PO)    Yes Reported, Patient   Zolpidem Tartrate (AMBIEN PO)    Yes Reported, Patient       ALLERGIES: No Known Allergies    PMH:   Past Medical History:   Diagnosis Date     Arthritis        PSH:   Past Surgical History:   Procedure Laterality Date     ENHANCE LASER REFRACTIVE BILATERAL EXISTING PT IN PARAMETERS Bilateral            SH:   Social History     Tobacco Use     Smoking status: Never Smoker     Smokeless tobacco: Never Used   Substance Use Topics     Alcohol use: Not on file       FH:   Family History    Problem Relation Age of Onset     Glaucoma No family hx of      Macular Degeneration No family hx of        ROS: Denies chest pain, shortness of breath, diabetes, MI, CVA, DVT, PE, and bleeding disorders.    PHYSICAL EXAMINATION:   General: No acute distress.  On examination of bilateral wrists, exam findings are quite similar.  There is crepitus with thumb grind test that is also very tender.  Palpation over the first dorsal compartment at the distal wrist is also very tender and boggy.  Finkelstein maneuver also exquisitely tender bilaterally.  Patient has skin color changes over the first dorsal compartments where previous steroid injections were performed.  Shah maneuver is only minimally tender.  Radiocarpal joint loading is only minimally tender.  DRUJ stable.  Intersection syndrome area is nontender.  Radial tunnel is nontender.    IMAGING: Bilateral wrist MRIs were reviewed.  This showed evidence for bilateral thumb CMC arthritis and first dorsal compartment tenosynovitis.    ASSESSMENT: Bilateral thumb CMC arthritis and de Quervain's tendinitis, right worse than left.    PLAN: We will work on transferring x-rays from [facility] for review.  This will likely support the diagnosis of bilateral thumb CMC arthritis.  Given patient has failed conservative treatment, we discussed her surgical options to include trapeziectomy with FCR LRTI and open release of first dorsal compartment as well as doing nothing.  I went over the surgical details for this outpatient 2.5 hour surgery.  I explained to her the risks to include bleeding, infection, injury to surrounding structures, chronic stiffness, chronic pain, nerve injury, translocation of tendons, subsidence, recurrence, wound healing difficulties, and need for revision surgery.  Patient accepts the associated risks and wishes to proceed with surgery.  She would like to do this under sedation with a regional block.  She will require a preoperative  H&P.  Following surgery, she will be immobilized in a thumb spica short arm splint for 4 weeks, then 6 to 8 weeks of hand therapy.  All questions were answered.    Total time spent with patient was 30 min of which greater than 50% was in counseling.    Answers for HPI/ROS submitted by the patient on 1/2/2021   General Symptoms: No  Skin Symptoms: No  HENT Symptoms: No  EYE SYMPTOMS: No  HEART SYMPTOMS: No  LUNG SYMPTOMS: No  INTESTINAL SYMPTOMS: No  URINARY SYMPTOMS: No  GYNECOLOGIC SYMPTOMS: No  BREAST SYMPTOMS: No  SKELETAL SYMPTOMS: Yes  BLOOD SYMPTOMS: No  NERVOUS SYSTEM SYMPTOMS: No  MENTAL HEALTH SYMPTOMS: No  Back pain: No  Muscle aches: No  Neck pain: No  Swollen joints: No  Joint pain: Yes  Bone pain: No  Muscle cramps: No  Muscle weakness: No  Joint stiffness: Yes  Bone fracture: No

## 2021-01-05 NOTE — NURSING NOTE
Reason For Visit:   Chief Complaint   Patient presents with     Consult For     Dequervian's bilateral       Primary MD: Ha Will    ?  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  Work status?  Full time..  Date of surgery: NA  Type of surgery: NA.  Smoker: No  Request smoking cessation information: No      There were no vitals taken for this visit.      Pain Assessment  Patient Currently in Pain: Yes  0-10 Pain Scale: 8  Primary Pain Location: Hand(Bilateral - right is worse than left)               QuickDASH Assessment  QuickDASH Main 5/5/2015   1. Open a tight or new jar. 2   2. Do heavy household chores (e.g., wash walls, floors). 2   3. Carry a shopping bag or briefcase. 2   4. Wash your back. 2   5. Use a knife to cut food. 1   6. Recreational activities in which you take some force or impact through your arm, shoulder or hand (e.g., golf, hammering, tennis, etc.). 2   7. During the past week, to what extent has your arm, shoulder or hand problem interfered with your normal social activities with family, friends, neighbours or groups? 2   8. During the past week, were you limited in your work or other regular daily activities as a result of your arm, shoulder or hand problem? 2   9. Arm, shoulder or hand pain. 2   10. Tingling (pins and needles) in your arm,shoulder or hand. 1   11. During the past week, how much difficulty have you had sleeping because of the pain in your arm, shoulder or hand? ([place] number) 3   SUM: 21          No Known Allergies    Tg Barr ATC

## 2021-01-05 NOTE — PROGRESS NOTES
REFERRING PROVIDER: Stas Cedeno    REASON FOR CONSULTATION: Bilateral de Quervain's tendinitis.    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 Stas Cedeno for evaluation and possible surgical management of bilateral de Quervain's tendinitis.  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  She has noticed radial sided wrist pain throughout her career, but it acutely worsened over the last 1 year.  She has been seeing Dr. Cedeno for this and has also seen hand therapy.  She has tried splinting, exercises, and a steroid injection to bilateral wrists about 2 to 3 months ago in the region of the first dorsal compartment which was ultrasound-guided.  Her symptoms recurred within weeks after these injections.  Her symptoms are now affecting her daily and affecting her work as well.  Her right side is worse than the left.  Pain is sharp in nature and radiates proximally.    MEDS:   Prior to Admission medications    Medication Sig Start Date End Date Taking? Authorizing Provider   Lansoprazole (PREVACID PO)    Yes Reported, Patient   Levothyroxine Sodium (SYNTHROID PO)    Yes Reported, Patient   Zolpidem Tartrate (AMBIEN PO)    Yes Reported, Patient       ALLERGIES: No Known Allergies    PMH:   Past Medical History:   Diagnosis Date     Arthritis        PSH:   Past Surgical History:   Procedure Laterality Date     ENHANCE LASER REFRACTIVE BILATERAL EXISTING PT IN PARAMETERS Bilateral            SH:   Social History     Tobacco Use     Smoking status: Never Smoker     Smokeless tobacco: Never Used   Substance Use Topics     Alcohol use: Not on file       FH:   Family History   Problem Relation Age of Onset     Glaucoma No family hx of      Macular Degeneration No family hx of        ROS: Denies chest pain, shortness of breath, diabetes, MI, CVA, DVT, PE, and bleeding disorders.    PHYSICAL EXAMINATION:   General: No acute distress.  On  examination of bilateral wrists, exam findings are quite similar.  There is crepitus with thumb grind test that is also very tender.  Palpation over the first dorsal compartment at the distal wrist is also very tender and boggy.  Finkelstein maneuver also exquisitely tender bilaterally.  Patient has skin color changes over the first dorsal compartments where previous steroid injections were performed.  Shah maneuver is only minimally tender.  Radiocarpal joint loading is only minimally tender.  DRUJ stable.  Intersection syndrome area is nontender.  Radial tunnel is nontender.    IMAGING: Bilateral wrist MRIs were reviewed.  This showed evidence for bilateral thumb CMC arthritis and first dorsal compartment tenosynovitis.    ASSESSMENT: Bilateral thumb CMC arthritis and de Quervain's tendinitis, right worse than left.    PLAN: We will work on transferring x-rays from [facility] for review.  This will likely support the diagnosis of bilateral thumb CMC arthritis.  Given patient has failed conservative treatment, we discussed her surgical options to include trapeziectomy with FCR LRTI and open release of first dorsal compartment as well as doing nothing.  I went over the surgical details for this outpatient 2.5 hour surgery.  I explained to her the risks to include bleeding, infection, injury to surrounding structures, chronic stiffness, chronic pain, nerve injury, translocation of tendons, subsidence, recurrence, wound healing difficulties, and need for revision surgery.  Patient accepts the associated risks and wishes to proceed with surgery.  She would like to do this under sedation with a regional block.  She will require a preoperative H&P.  Following surgery, she will be immobilized in a thumb spica short arm splint for 4 weeks, then 6 to 8 weeks of hand therapy.  All questions were answered.    Total time spent with patient was 30 min of which greater than 50% was in counseling.    Answers for  HPI/ROS submitted by the patient on 1/2/2021   General Symptoms: No  Skin Symptoms: No  HENT Symptoms: No  EYE SYMPTOMS: No  HEART SYMPTOMS: No  LUNG SYMPTOMS: No  INTESTINAL SYMPTOMS: No  URINARY SYMPTOMS: No  GYNECOLOGIC SYMPTOMS: No  BREAST SYMPTOMS: No  SKELETAL SYMPTOMS: Yes  BLOOD SYMPTOMS: No  NERVOUS SYSTEM SYMPTOMS: No  MENTAL HEALTH SYMPTOMS: No  Back pain: No  Muscle aches: No  Neck pain: No  Swollen joints: No  Joint pain: Yes  Bone pain: No  Muscle cramps: No  Muscle weakness: No  Joint stiffness: Yes  Bone fracture: No

## 2021-01-06 ENCOUNTER — TELEPHONE (OUTPATIENT)
Dept: ORTHOPEDICS | Facility: CLINIC | Age: 61
End: 2021-01-06

## 2021-01-06 NOTE — TELEPHONE ENCOUNTER
Teaching Flowsheet   Relevant Diagnosis: De Quuervain's disease (radial styloid tenosynovitis).  Arthritis of carpometacarpal joints of both thumbs.    Teaching Topic: Right thumb trapeziectomy and arthroplasty, right first dorsal compartment release.    CSC under MAC with Regional block anesthesia with Dr Jon Matthew  BMI 35.08     Discussed need for Covid testing to be collected and resulted within 4 days of her surgery.  She is possibly going to try to coordinate this with her work and when she gets her immunization but if this does not work she has the Covid number to schedule a test through [employer].     She will need a pre-op H&P within 30 days of her surgery date.     She was already given a teaching packet in clinic yesterday.     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    Person(s) involved in teaching:   Patient     Motivation Level:  Asks Questions: Yes  Eager to Learn: Yes  Cooperative: Yes  Receptive (willing/able to accept information): Yes  Any cultural factors/[employer]ondoism beliefs that may influence understanding or compliance? No    Patient demonstrates understanding of the following:  Reason for the appointment, diagnosis and treatment plan: Yes  Knowledge of proper use of medications and conditions for which they are ordered (with special attention to potential side effects or drug interactions): Yes  Which situations necessitate calling provider and whom to contact: Yes    Teaching Concerns Addressed:   Proper use and care of  (medical equip, care aids, etc.): Yes  Nutritional needs and diet plan: Yes  Pain management techniques: Yes  Wound Care: Yes  How and/when to access community resources: Yes     Instructional Materials Used/Given: Preoperative teaching packet was given to patient in clinic yesterday.  Mailed general post operative care information today.  Patient will  Hibiclens at work.   Patient will contact RN with any further questions or concerns. Margoth Ritchie RN

## 2021-01-07 ENCOUNTER — TELEPHONE (OUTPATIENT)
Dept: ORTHOPEDICS | Facility: CLINIC | Age: 61
End: 2021-01-07

## 2021-01-07 NOTE — TELEPHONE ENCOUNTER
Patient left voice mail stating she is aware of the requirement for Covid testing within 4 days of surgery, and she wonders if it is required to be vaccinated for Covid prior to surgery.  OK to leave a voice mail.     10:28 AM  Voice mail left for patient that Covid vaccination is not required prior to surgery.   Margoth Ritchie RN

## 2021-01-10 ENCOUNTER — HEALTH MAINTENANCE LETTER (OUTPATIENT)
Age: 61
End: 2021-01-10

## 2021-01-20 ENCOUNTER — MEDICAL CORRESPONDENCE (OUTPATIENT)
Dept: HEALTH INFORMATION MANAGEMENT | Facility: CLINIC | Age: 61
End: 2021-01-20

## 2021-02-14 DIAGNOSIS — Z11.59 ENCOUNTER FOR SCREENING FOR OTHER VIRAL DISEASES: Primary | ICD-10-CM

## 2021-02-15 ENCOUNTER — TELEPHONE (OUTPATIENT)
Dept: ORTHOPEDICS | Facility: CLINIC | Age: 61
End: 2021-02-15

## 2021-02-15 NOTE — TELEPHONE ENCOUNTER
Called and spoke with patient at request of Dr Matthew's  to confirm surgery date is indeed set for 3-1-21.     Surgery scheduler will still need to assist setting Covid test date and time and post operative visits.  Patient has her pre-op H&P this week.    Patient states the [facility] paperwork from last week never made it to her employer.  Printed forms again from [employer], faxed to [phone] and also emailed them to [email or web address].     Short term disability forms completed and awaiting Dr Matthew's signature tomorrow. Margoth Ritchie RN

## 2021-02-16 ENCOUNTER — TELEPHONE (OUTPATIENT)
Dept: PLASTIC SURGERY | Facility: CLINIC | Age: 61
End: 2021-02-16

## 2021-02-16 NOTE — TELEPHONE ENCOUNTER
I contacted the patient via phone and spoke with her to confirm the scheduled dates and provide the following information:     Surgery is scheduled with Dr. Matthew on 3/1 at [facility].  Scheduled per surgeon    H&P: to be completed by PCP.    COVID-19 test scheduled for 2/26    POST-OP: 3/9    They are aware to arrive at 10:00 AM.    The surgery packet was provided via letter in the mail per patient request.

## 2021-02-18 ENCOUNTER — TELEPHONE (OUTPATIENT)
Dept: PLASTIC SURGERY | Facility: CLINIC | Age: 61
End: 2021-02-18

## 2021-02-18 NOTE — TELEPHONE ENCOUNTER
Called patient and left a detailed voicemail explaining surgery location changed from [facility] to [place]. Explained Dr. Matthew has other cases on 3/1 at [facility] so it will be easier for surgeons schedule. Also explained new arrival time will be 11:00 AM and surgery start time is 1:00 PM. Explained that writer will send a [employer] message with this information as well and if patient has questions/concerns, they can call writer back or respond on [employer]. Provided call back number. RN working on [facility] paperwork with patient has also been updated on location change.

## 2021-02-25 ENCOUNTER — DOCUMENTATION ONLY (OUTPATIENT)
Dept: CARE COORDINATION | Facility: CLINIC | Age: 61
End: 2021-02-25

## 2021-02-26 ENCOUNTER — ANESTHESIA EVENT (OUTPATIENT)
Dept: SURGERY | Facility: CLINIC | Age: 61
End: 2021-02-26
Payer: COMMERCIAL

## 2021-02-26 DIAGNOSIS — Z11.59 ENCOUNTER FOR SCREENING FOR OTHER VIRAL DISEASES: ICD-10-CM

## 2021-02-26 LAB
LABORATORY COMMENT REPORT: NORMAL
SARS-COV-2 RNA RESP QL NAA+PROBE: NEGATIVE
SARS-COV-2 RNA RESP QL NAA+PROBE: NORMAL
SPECIMEN SOURCE: NORMAL
SPECIMEN SOURCE: NORMAL

## 2021-02-26 PROCEDURE — U0005 INFEC AGEN DETEC AMPLI PROBE: HCPCS | Performed by: PLASTIC SURGERY

## 2021-02-26 PROCEDURE — U0003 INFECTIOUS AGENT DETECTION BY NUCLEIC ACID (DNA OR RNA); SEVERE ACUTE RESPIRATORY SYNDROME CORONAVIRUS 2 (SARS-COV-2) (CORONAVIRUS DISEASE [COVID-19]), AMPLIFIED PROBE TECHNIQUE, MAKING USE OF HIGH THROUGHPUT TECHNOLOGIES AS DESCRIBED BY CMS-2020-01-R: HCPCS | Performed by: PLASTIC SURGERY

## 2021-02-26 SDOH — HEALTH STABILITY: MENTAL HEALTH: HOW OFTEN DO YOU HAVE A DRINK CONTAINING ALCOHOL?: NEVER

## 2021-03-01 ENCOUNTER — APPOINTMENT (OUTPATIENT)
Dept: GENERAL RADIOLOGY | Facility: CLINIC | Age: 61
End: 2021-03-01
Attending: PLASTIC SURGERY
Payer: COMMERCIAL

## 2021-03-01 ENCOUNTER — HOSPITAL ENCOUNTER (OUTPATIENT)
Facility: CLINIC | Age: 61
Discharge: HOME | End: 2021-03-01
Attending: PLASTIC SURGERY | Admitting: PLASTIC SURGERY
Payer: COMMERCIAL

## 2021-03-01 ENCOUNTER — ANCILLARY PROCEDURE (OUTPATIENT)
Dept: ULTRASOUND IMAGING | Facility: CLINIC | Age: 61
End: 2021-03-01

## 2021-03-01 ENCOUNTER — ANESTHESIA (OUTPATIENT)
Dept: SURGERY | Facility: CLINIC | Age: 61
End: 2021-03-01
Payer: COMMERCIAL

## 2021-03-01 VITALS
TEMPERATURE: 98.1 F | DIASTOLIC BLOOD PRESSURE: 78 MMHG | BODY MASS INDEX: 36.45 KG/M2 | OXYGEN SATURATION: 93 % | WEIGHT: 205.69 LBS | HEIGHT: 63 IN | HEART RATE: 79 BPM | SYSTOLIC BLOOD PRESSURE: 145 MMHG | RESPIRATION RATE: 18 BRPM

## 2021-03-01 DIAGNOSIS — M18.0 ARTHRITIS OF CARPOMETACARPAL (CMC) JOINTS OF BOTH THUMBS: Primary | ICD-10-CM

## 2021-03-01 DIAGNOSIS — M65.4 DE QUERVAIN'S DISEASE (RADIAL STYLOID TENOSYNOVITIS): ICD-10-CM

## 2021-03-01 LAB
ANION GAP SERPL CALCULATED.3IONS-SCNC: 4 MMOL/L (ref 3–14)
BUN SERPL-MCNC: 21 MG/DL (ref 7–30)
CALCIUM SERPL-MCNC: 9 MG/DL (ref 8.5–10.1)
CHLORIDE SERPL-SCNC: 105 MMOL/L (ref 94–109)
CO2 SERPL-SCNC: 28 MMOL/L (ref 20–32)
CREAT SERPL-MCNC: 0.7 MG/DL (ref 0.52–1.04)
GFR SERPL CREATININE-BSD FRML MDRD: >90 ML/MIN/{1.73_M2}
GLUCOSE BLDC GLUCOMTR-MCNC: 173 MG/DL (ref 70–99)
GLUCOSE BLDC GLUCOMTR-MCNC: 178 MG/DL (ref 70–99)
GLUCOSE SERPL-MCNC: 146 MG/DL (ref 70–99)
POTASSIUM SERPL-SCNC: 4.3 MMOL/L (ref 3.4–5.3)
SODIUM SERPL-SCNC: 137 MMOL/L (ref 133–144)

## 2021-03-01 PROCEDURE — 258N000003 HC RX IP 258 OP 636: Performed by: ANESTHESIOLOGY

## 2021-03-01 PROCEDURE — 250N000011 HC RX IP 250 OP 636: Performed by: PLASTIC SURGERY

## 2021-03-01 PROCEDURE — 710N000012 HC RECOVERY PHASE 2, PER MINUTE: Performed by: PLASTIC SURGERY

## 2021-03-01 PROCEDURE — 250N000011 HC RX IP 250 OP 636: Performed by: STUDENT IN AN ORGANIZED HEALTH CARE EDUCATION/TRAINING PROGRAM

## 2021-03-01 PROCEDURE — 82962 GLUCOSE BLOOD TEST: CPT

## 2021-03-01 PROCEDURE — 360N000076 HC SURGERY LEVEL 3, PER MIN: Performed by: PLASTIC SURGERY

## 2021-03-01 PROCEDURE — 250N000011 HC RX IP 250 OP 636: Performed by: NURSE ANESTHETIST, CERTIFIED REGISTERED

## 2021-03-01 PROCEDURE — 250N000009 HC RX 250: Performed by: PLASTIC SURGERY

## 2021-03-01 PROCEDURE — C1713 ANCHOR/SCREW BN/BN,TIS/BN: HCPCS | Performed by: PLASTIC SURGERY

## 2021-03-01 PROCEDURE — 999N000179 XR SURGERY CARM FLUORO LESS THAN 5 MIN W STILLS: Mod: TC

## 2021-03-01 PROCEDURE — 250N000009 HC RX 250: Performed by: NURSE ANESTHETIST, CERTIFIED REGISTERED

## 2021-03-01 PROCEDURE — 272N000001 HC OR GENERAL SUPPLY STERILE: Performed by: PLASTIC SURGERY

## 2021-03-01 PROCEDURE — 370N000017 HC ANESTHESIA TECHNICAL FEE, PER MIN: Performed by: PLASTIC SURGERY

## 2021-03-01 PROCEDURE — 80048 BASIC METABOLIC PNL TOTAL CA: CPT | Performed by: ANESTHESIOLOGY

## 2021-03-01 PROCEDURE — 999N000128 HC STATISTIC PERIPHERAL IV START W/O US GUIDANCE

## 2021-03-01 PROCEDURE — 250N000011 HC RX IP 250 OP 636: Performed by: ANESTHESIOLOGY

## 2021-03-01 PROCEDURE — 999N000141 HC STATISTIC PRE-PROCEDURE NURSING ASSESSMENT: Performed by: PLASTIC SURGERY

## 2021-03-01 RX ORDER — NALOXONE HYDROCHLORIDE 0.4 MG/ML
0.2 INJECTION, SOLUTION INTRAMUSCULAR; INTRAVENOUS; SUBCUTANEOUS
Status: DISCONTINUED | OUTPATIENT
Start: 2021-03-01 | End: 2021-03-01 | Stop reason: HOSPADM

## 2021-03-01 RX ORDER — FENTANYL CITRATE 50 UG/ML
INJECTION, SOLUTION INTRAMUSCULAR; INTRAVENOUS PRN
Status: DISCONTINUED | OUTPATIENT
Start: 2021-03-01 | End: 2021-03-01

## 2021-03-01 RX ORDER — ONDANSETRON 2 MG/ML
4 INJECTION INTRAMUSCULAR; INTRAVENOUS ONCE
Status: COMPLETED | OUTPATIENT
Start: 2021-03-01 | End: 2021-03-01

## 2021-03-01 RX ORDER — PROPOFOL 10 MG/ML
INJECTION, EMULSION INTRAVENOUS CONTINUOUS PRN
Status: DISCONTINUED | OUTPATIENT
Start: 2021-03-01 | End: 2021-03-01

## 2021-03-01 RX ORDER — OXYCODONE HYDROCHLORIDE 5 MG/1
5 TABLET ORAL EVERY 6 HOURS PRN
Qty: 20 TABLET | Refills: 0 | Status: SHIPPED | OUTPATIENT
Start: 2021-03-01

## 2021-03-01 RX ORDER — PROPOFOL 10 MG/ML
INJECTION, EMULSION INTRAVENOUS PRN
Status: DISCONTINUED | OUTPATIENT
Start: 2021-03-01 | End: 2021-03-01

## 2021-03-01 RX ORDER — AMOXICILLIN 250 MG
1-2 CAPSULE ORAL 2 TIMES DAILY
Qty: 30 TABLET | Refills: 0 | Status: SHIPPED | OUTPATIENT
Start: 2021-03-01

## 2021-03-01 RX ORDER — LIDOCAINE 40 MG/G
CREAM TOPICAL
Status: DISCONTINUED | OUTPATIENT
Start: 2021-03-01 | End: 2021-03-01 | Stop reason: HOSPADM

## 2021-03-01 RX ORDER — FENTANYL CITRATE 50 UG/ML
25-50 INJECTION, SOLUTION INTRAMUSCULAR; INTRAVENOUS
Status: DISCONTINUED | OUTPATIENT
Start: 2021-03-01 | End: 2021-03-01 | Stop reason: HOSPADM

## 2021-03-01 RX ORDER — DEXAMETHASONE SODIUM PHOSPHATE 4 MG/ML
INJECTION, SOLUTION INTRA-ARTICULAR; INTRALESIONAL; INTRAMUSCULAR; INTRAVENOUS; SOFT TISSUE PRN
Status: DISCONTINUED | OUTPATIENT
Start: 2021-03-01 | End: 2021-03-01

## 2021-03-01 RX ORDER — FLUMAZENIL 0.1 MG/ML
0.2 INJECTION, SOLUTION INTRAVENOUS
Status: DISCONTINUED | OUTPATIENT
Start: 2021-03-01 | End: 2021-03-01 | Stop reason: HOSPADM

## 2021-03-01 RX ORDER — ONDANSETRON 4 MG/1
4 TABLET, ORALLY DISINTEGRATING ORAL
Status: DISCONTINUED | OUTPATIENT
Start: 2021-03-01 | End: 2021-03-01 | Stop reason: HOSPADM

## 2021-03-01 RX ORDER — CEFAZOLIN SODIUM 2 G/100ML
2 INJECTION, SOLUTION INTRAVENOUS
Status: COMPLETED | OUTPATIENT
Start: 2021-03-01 | End: 2021-03-01

## 2021-03-01 RX ORDER — HYDROXYZINE PAMOATE 25 MG/1
25 CAPSULE ORAL EVERY 6 HOURS PRN
Qty: 30 CAPSULE | Refills: 0 | Status: SHIPPED | OUTPATIENT
Start: 2021-03-01

## 2021-03-01 RX ORDER — OXYCODONE HYDROCHLORIDE 5 MG/1
5 TABLET ORAL EVERY 6 HOURS PRN
Qty: 10 TABLET | Refills: 0 | Status: SHIPPED | OUTPATIENT
Start: 2021-03-01 | End: 2021-03-01

## 2021-03-01 RX ORDER — ONDANSETRON 2 MG/ML
INJECTION INTRAMUSCULAR; INTRAVENOUS PRN
Status: DISCONTINUED | OUTPATIENT
Start: 2021-03-01 | End: 2021-03-01

## 2021-03-01 RX ORDER — LIDOCAINE HYDROCHLORIDE 20 MG/ML
INJECTION, SOLUTION INFILTRATION; PERINEURAL PRN
Status: DISCONTINUED | OUTPATIENT
Start: 2021-03-01 | End: 2021-03-01

## 2021-03-01 RX ORDER — SODIUM CHLORIDE, SODIUM LACTATE, POTASSIUM CHLORIDE, CALCIUM CHLORIDE 600; 310; 30; 20 MG/100ML; MG/100ML; MG/100ML; MG/100ML
INJECTION, SOLUTION INTRAVENOUS CONTINUOUS
Status: DISCONTINUED | OUTPATIENT
Start: 2021-03-01 | End: 2021-03-01 | Stop reason: HOSPADM

## 2021-03-01 RX ORDER — HYDROXYZINE HYDROCHLORIDE 25 MG/1
25 TABLET, FILM COATED ORAL
Status: DISCONTINUED | OUTPATIENT
Start: 2021-03-01 | End: 2021-03-01 | Stop reason: HOSPADM

## 2021-03-01 RX ORDER — NALOXONE HYDROCHLORIDE 0.4 MG/ML
0.4 INJECTION, SOLUTION INTRAMUSCULAR; INTRAVENOUS; SUBCUTANEOUS
Status: DISCONTINUED | OUTPATIENT
Start: 2021-03-01 | End: 2021-03-01 | Stop reason: HOSPADM

## 2021-03-01 RX ORDER — CEFAZOLIN SODIUM 1 G/3ML
1 INJECTION, POWDER, FOR SOLUTION INTRAMUSCULAR; INTRAVENOUS SEE ADMIN INSTRUCTIONS
Status: DISCONTINUED | OUTPATIENT
Start: 2021-03-01 | End: 2021-03-01 | Stop reason: HOSPADM

## 2021-03-01 RX ORDER — LIDOCAINE HYDROCHLORIDE AND EPINEPHRINE 10; 10 MG/ML; UG/ML
INJECTION, SOLUTION INFILTRATION; PERINEURAL PRN
Status: DISCONTINUED | OUTPATIENT
Start: 2021-03-01 | End: 2021-03-01 | Stop reason: HOSPADM

## 2021-03-01 RX ADMIN — CEFAZOLIN 2 G: 10 INJECTION, POWDER, FOR SOLUTION INTRAVENOUS at 15:57

## 2021-03-01 RX ADMIN — FENTANYL CITRATE 50 MCG: 50 INJECTION, SOLUTION INTRAMUSCULAR; INTRAVENOUS at 19:20

## 2021-03-01 RX ADMIN — FENTANYL CITRATE 50 MCG: 50 INJECTION, SOLUTION INTRAMUSCULAR; INTRAVENOUS at 19:50

## 2021-03-01 RX ADMIN — FENTANYL CITRATE 50 MCG: 50 INJECTION, SOLUTION INTRAMUSCULAR; INTRAVENOUS at 12:45

## 2021-03-01 RX ADMIN — PROPOFOL 100 MCG/KG/MIN: 10 INJECTION, EMULSION INTRAVENOUS at 15:49

## 2021-03-01 RX ADMIN — SODIUM CHLORIDE, POTASSIUM CHLORIDE, SODIUM LACTATE AND CALCIUM CHLORIDE: 600; 310; 30; 20 INJECTION, SOLUTION INTRAVENOUS at 15:47

## 2021-03-01 RX ADMIN — MEPIVACAINE HYDROCHLORIDE 20 ML: 20 INJECTION, SOLUTION EPIDURAL; INFILTRATION at 12:47

## 2021-03-01 RX ADMIN — DEXAMETHASONE SODIUM PHOSPHATE 4 MG: 4 INJECTION, SOLUTION INTRA-ARTICULAR; INTRALESIONAL; INTRAMUSCULAR; INTRAVENOUS; SOFT TISSUE at 16:30

## 2021-03-01 RX ADMIN — ONDANSETRON 4 MG: 2 INJECTION INTRAMUSCULAR; INTRAVENOUS at 16:30

## 2021-03-01 RX ADMIN — PROPOFOL 50 MG: 10 INJECTION, EMULSION INTRAVENOUS at 16:32

## 2021-03-01 RX ADMIN — MIDAZOLAM 2 MG: 1 INJECTION INTRAMUSCULAR; INTRAVENOUS at 12:44

## 2021-03-01 RX ADMIN — FENTANYL CITRATE 25 MCG: 50 INJECTION, SOLUTION INTRAMUSCULAR; INTRAVENOUS at 16:35

## 2021-03-01 RX ADMIN — SODIUM CHLORIDE, POTASSIUM CHLORIDE, SODIUM LACTATE AND CALCIUM CHLORIDE: 600; 310; 30; 20 INJECTION, SOLUTION INTRAVENOUS at 19:14

## 2021-03-01 RX ADMIN — MIDAZOLAM 2 MG: 1 INJECTION INTRAMUSCULAR; INTRAVENOUS at 15:38

## 2021-03-01 RX ADMIN — FENTANYL CITRATE 25 MCG: 50 INJECTION, SOLUTION INTRAMUSCULAR; INTRAVENOUS at 16:32

## 2021-03-01 RX ADMIN — FENTANYL CITRATE 25 MCG: 50 INJECTION, SOLUTION INTRAMUSCULAR; INTRAVENOUS at 17:15

## 2021-03-01 RX ADMIN — PROPOFOL 50 MG: 10 INJECTION, EMULSION INTRAVENOUS at 16:28

## 2021-03-01 RX ADMIN — CEFAZOLIN 1 G: 1 INJECTION, POWDER, FOR SOLUTION INTRAMUSCULAR; INTRAVENOUS at 18:00

## 2021-03-01 RX ADMIN — LIDOCAINE HYDROCHLORIDE 80 MG: 20 INJECTION, SOLUTION INFILTRATION; PERINEURAL at 15:49

## 2021-03-01 RX ADMIN — ONDANSETRON 4 MG: 2 INJECTION INTRAMUSCULAR; INTRAVENOUS at 20:38

## 2021-03-01 RX ADMIN — PROPOFOL: 10 INJECTION, EMULSION INTRAVENOUS at 17:11

## 2021-03-01 RX ADMIN — SODIUM CHLORIDE, POTASSIUM CHLORIDE, SODIUM LACTATE AND CALCIUM CHLORIDE: 600; 310; 30; 20 INJECTION, SOLUTION INTRAVENOUS at 19:45

## 2021-03-01 RX ADMIN — FENTANYL CITRATE 50 MCG: 50 INJECTION, SOLUTION INTRAMUSCULAR; INTRAVENOUS at 19:10

## 2021-03-01 ASSESSMENT — MIFFLIN-ST. JEOR: SCORE: 1462.13

## 2021-03-01 NOTE — ANESTHESIA PREPROCEDURE EVALUATION
Anesthesia Pre-Procedure Evaluation    Patient: Bre Bush   MRN: [id number] : 1960        Preoperative Diagnosis: De Quervain's disease (radial styloid tenosynovitis) [M65.4]  Arthritis of carpometacarpal (CMC) joints of both thumbs [M18.0]   Procedure : Procedure(s):  Right thumb trapeziectomy and arthroplasty, right first dorsal compartment release     Past Medical History:   Diagnosis Date     Anxiety      Arthritis      Depression      Gastroesophageal reflux disease      Obese      Prediabetes      Thyroid disease       Past Surgical History:   Procedure Laterality Date     CHOLECYSTECTOMY       COLONOSCOPY       ENDOSCOPY       ENHANCE LASER REFRACTIVE BILATERAL EXISTING PT IN PARAMETERS Bilateral          FOOT SURGERY       KNEE SURGERY       LASIK       TUBAL LIGATION       wisdom teeth removed        No Known Allergies   Social History     Tobacco Use     Smoking status: Never Smoker     Smokeless tobacco: Never Used   Substance Use Topics     Alcohol use: Never     Frequency: Never      Wt Readings from Last 1 Encounters:   21 93.3 kg (205 lb 11 oz)        Anesthesia Evaluation            ROS/MED HX  ENT/Pulmonary:       Neurologic:       Cardiovascular:    (-) ICD   METS/Exercise Tolerance:     Hematologic:       Musculoskeletal:   (+) arthritis,     GI/Hepatic:     (+) GERD,     Renal/Genitourinary:       Endo:     (+) thyroid problem, Obesity,     Psychiatric/Substance Use:     (+) psychiatric history anxiety and depression     Infectious Disease:       Malignancy:       Other:            Physical Exam    Airway        Mallampati: III   TM distance: > 3 FB   Neck ROM: limited   Mouth opening: > 3 cm    Respiratory Devices and Support         Dental  no notable dental history         Cardiovascular   cardiovascular exam normal       Rhythm and rate: regular and normal     Pulmonary   pulmonary exam normal        breath sounds clear to auscultation           OUTSIDE LABS:  CBC: No  results found for: WBC, HGB, HCT, PLT  BMP:   Lab Results   Component Value Date     03/01/2021    POTASSIUM 4.3 03/01/2021    CHLORIDE 105 03/01/2021    CO2 28 03/01/2021    BUN 21 03/01/2021    CR 0.70 03/01/2021     (H) 03/01/2021     COAGS: No results found for: PTT, INR, FIBR  POC:   Lab Results   Component Value Date     (H) 03/01/2021     HEPATIC: No results found for: ALBUMIN, PROTTOTAL, ALT, AST, GGT, ALKPHOS, BILITOTAL, BILIDIRECT, VINAY  OTHER:   Lab Results   Component Value Date    PIPER 9.0 03/01/2021       Anesthesia Plan    ASA Status:  2      Anesthesia Type: Peripheral Nerve Block.              Consents    Anesthesia Plan(s) and associated risks, benefits, and realistic alternatives discussed. Questions answered and patient/representative(s) expressed understanding.     - Discussed with:  Patient      - Extended Intubation/Ventilatory Support Discussed: No.      - Patient is DNR/DNI Status: No    Use of blood products discussed: No .     Postoperative Care    Pain management: IV analgesics, Peripheral nerve block (Single Shot).   PONV prophylaxis: Ondansetron (or other 5HT-3), Dexamethasone or Solumedrol     Comments:                Anil Chase MD

## 2021-03-01 NOTE — OR NURSING
Right brachial plexus nerve block performed without complications.  VSS.  Pt tolerated well.  Will continue to monitor.

## 2021-03-01 NOTE — ANESTHESIA PROCEDURE NOTES
Pre-Procedure   Staff -   Anesthesiologist:  Alvin Guthrie MD  Resident/Fellow: Cris Abbott MD  Performed By: resident  Location: pre-op  Pre-Anesthestic Checklist: patient identified, IV checked, site marked, risks and benefits discussed, informed consent, monitors and equipment checked, pre-op evaluation, at physician/surgeon's request and post-op pain management  Timeout:  Correct Patient: Yes   Correct Procedure: Yes   Correct Site: Yes   Correct Position: Yes   Correct Laterality: Yes   Site Marked: Yes    Procedure Documentation  Procedure: Supraclavicular  Diagnosis: POST OPERATIVE PAIN  Laterality: bilateral  Patient Position:supine  Patient Prep/Sterile Barriers: sterile gloves, mask, Chloraprep  Needle type: short bevel  Needle Gauge: 21.   Needle Length (millimeters) 110   Ultrasound guided, Ultrasound used to identify targeted nerve, plexus, vascular marker, or fascial plane and place a needle adjacent to it in real-time, Ultrasound was used to visualize the spread of anesthetic in close proximity to the above referenced structure  A permanent image is entered into the patient's record.    Assessment/Narrative      The placement was negative for: blood aspirated, painful injection and site bleeding  Paresthesias: No.  Bolus given via needle..   Secured via.   Insertion/Infusion Method: Single Shot  Complications: none  Injection made incrementally with aspirations every 5 mL.

## 2021-03-02 NOTE — ANESTHESIA CARE TRANSFER NOTE
Patient: Bre Bush    Procedure(s):  Right thumb trapeziectomy and arthroplasty, right first dorsal compartment release    Diagnosis: De Quervain's disease (radial styloid tenosynovitis) [M65.4]  Arthritis of carpometacarpal (CMC) joints of both thumbs [M18.0]  Diagnosis Additional Information: No value filed.    Anesthesia Type:   Peripheral Nerve Block     Note:      Level of Consciousness: awake  Oxygen Supplementation: nasal cannula  Level of Supplemental Oxygen (L/min / FiO2): 2  Independent Airway: airway patency satisfactory and stable  Dentition: dentition unchanged  Vital Signs Stable: post-procedure vital signs reviewed and stable  Report to RN Given: handoff report given  Patient transferred to: PACU    Handoff Report: Identifed the Patient, Identified the Reponsible Provider, Reviewed the pertinent medical history, Discussed the surgical course, Reviewed Intra-OP anesthesia mangement and issues during anesthesia, Set expectations for post-procedure period and Allowed opportunity for questions and acknowledgement of understanding      Vitals: (Last set prior to Anesthesia Care Transfer)  CRNA VITALS  3/1/2021 1828 - 3/1/2021 1913      3/1/2021             Pulse:  87    SpO2:  95 %    Resp Rate (observed):  (!) 1        Electronically Signed By: YSABEL Aiken CRNA  March 1, 2021  7:13 PM

## 2021-03-02 NOTE — ANESTHESIA POSTPROCEDURE EVALUATION
Patient: Bre Bush    Procedure(s):  Right thumb trapeziectomy and arthroplasty, right first dorsal compartment release    Diagnosis:De Quervain's disease (radial styloid tenosynovitis) [M65.4]  Arthritis of carpometacarpal (CMC) joints of both thumbs [M18.0]  Diagnosis Additional Information: No value filed.    Anesthesia Type:  Peripheral Nerve Block    Note:  Disposition: Inpatient   Postop Pain Control: Uneventful            Sign Out: Well controlled pain   PONV: No   Neuro/Psych: Uneventful            Sign Out: Acceptable/Baseline neuro status   Airway/Respiratory: Uneventful            Sign Out: Acceptable/Baseline resp. status   CV/Hemodynamics: Uneventful            Sign Out: Acceptable CV status   Other NRE: NONE   DID A NON-ROUTINE EVENT OCCUR? No         Last vitals:  Vitals:    03/01/21 2045 03/01/21 2055 03/01/21 2100   BP: (!) 147/58  (!) 145/78   Pulse: 79  79   Resp: 20  18   Temp:   36.7  C (98.1  F)   SpO2: 99% 97% 93%       Last vitals prior to Anesthesia Care Transfer:  CRNA VITALS  3/1/2021 1828 - 3/1/2021 1928      3/1/2021             NIBP:  121/76    Ht Rate:  78    SpO2:  100 %    EKG:  Sinus rhythm          Electronically Signed By: Modesto Kang DO  March 1, 2021  9:24 PM

## 2021-03-02 NOTE — OP NOTE
DATE OF OPERATION: 2021    PREOPERATIVE DIAGNOSIS:   1.  Right de Quervain's tendinitis.  2.  Right thumb carpometacarpal joint arthritis.    POSTOPERATIVE DIAGNOSIS:   1.  Right de Quervain's tendinitis.  2.  Right thumb carpometacarpal joint arthritis.    PROCEDURES PERFORMED:   1.  Open release of right wrist first dorsal compartment.  2.  Right wrist trapeziectomy and flexor carpi radialis ligament reconstruction and tendon interposition for arthroplasty.    SURGEON: Jon Matthew MD    ASSISTANT: None.    ANESTHESIA: Monitored anesthesia care with a right upper extremity regional block.    ESTIMATED BLOOD LOSS: 10 mL    TOURNIQUET TIME: 129 min    FINDINGS: Within the first dorsal compartment, the extensor pollicis brevis and abductor pollicis longus tendons were in separate sheaths with the abductor pollicis longus tendon having 2 separate slips.  Complete removal of the trapezium was documented on fluoroscopic imaging.    SPECIMENS: None.    COMPLICATIONS: None.    DRAINS: None.    IMPLANTS: Arthrex corkscrew with FiberWire suture placed into the trapezoid to secure the tendon interposition arthroplasty.    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  She had longstanding pain that worsened over the last year.  She failed splinting, exercises, and steroid injection.  Risk benefits and alternatives were discussed for first dorsal compartment release and trapeziectomy with LR TI.  Patient accepted the associated risks and wished to proceed with surgery.    DESCRIPTION OF PROCEDURE:  Informed consent and markings were performed in the preoperative holding area.  Patient underwent a right upper extremity regional block with the anesthesia team.  Preoperative antibiotics were given.  She was then taken to the operating room and placed in supine position with all pressure  points well-padded and bilateral lower leg SCDs on.  Sedation was achieved.  A tourniquet was applied to the right upper arm and the remaining extremity was then prepped and draped circumferentially in a sterile fashion.  A timeout was performed.    A longitudinally oriented linear incision was designed over the radial aspect of the right dorsal wrist.  A curvilinear incision was designed at the glabrous and nonglabrous skin junction of the radial proximal thenar eminence.  A horizontally oriented short incision was designed over the naturally occurring volar wrist crease over the flexor carpi radialis tendon.  The extremity was then exsanguinated and the tourniquet was brought to 250.  There was agitation requiring additional sedation and injection of approximately 8 cc of 1% lidocaine with 1 200,000 epinephrine into the proximal dorsal forearm.    The proposed incisions were made with a 15 blade scalpel.  Blunt dissection was performed at the dorsal wrist to identify the first dorsal compartment of the extensor retinaculum.  Superficial radial nerve and its branches were visualized and protected at all times.  A blade was used to open this compartment along its most ulnar aspect along its entirety under direct visualization with care taken not to damage the underlying tendons.  The compartment was tight.  The extensor pollicis brevis and abductor pollicis longus tendons were in separate sheaths.  The abductor pollicis longus had 2 slips.  These were all fully released and were deliverable outside of the wound.  Hemostasis was achieved using bipolar cautery.  The radial slip of the extensor retinaculum was left so that the tendons would not migrate volarly.  The wound was thoroughly washed.  It was then closed in layers using 4-0 Monocryl to approximate the deep dermis in interrupted fashion and the skin in a subcuticular fashion.    Attention was then turned to the thumb base.  Dissection was performed bluntly  between the thenar musculature and the abductor pollicis longus tendon.  Sensory nerves were visualized and protected at all times.  The trapezium capsule was visualized.  The trapezium location was confirmed on fluoroscopic imaging.  The capsule was opened in a longitudinally oriented linear fashion.  All ligamentous attachments were divided sharply and the trapezium was removed in piecemeal using a rongeur and total with care taken not to damage the flexor carpi radialis tendon nor the radial artery.  Complete removal of the trapezium was documented on fluoroscopic imaging.    Using a 4 mm hand-held bur, a cortical hole was drilled through the dorsal base cortex of the first metacarpal about 1 cm distal to the joint.  Another hole was drilled along the articular surface at the base one third of the way dorsal from the volar beak.  These 2 holes were connected within the metacarpal bone.    The volar wrist crease incision was dissected bluntly to identify the flexor carpi radialis tendon.  The entire tendon was harvested using an open pigtail tendon stripper.  Excess muscle was removed from the tendon.  The tendon was then passed through into the trapeziectomy wound.  It was then passed through the metacarpal bur holes from proximal to distal.  The thumb was distracted and placed in a functional abducted position.  3-0 Ethibond figure-of-eight sutures were then placed along the dorsal base of the metacarpal to secure this flexor carpi radialis tendon graft to the surrounding periosteum.  This set the tension of the ligament reconstruction.  The tendon graft was then brought back onto itself and sutured to up to itself using a 3-0 Ethibond mattress suture.  This placed the thumb and suspended it and into a functional position.  This was documented on fluoroscopic imaging.    An [employer] device was used to drill a K wire hole into the trapezoid and secure 2-0 FiberWire sutures.  The remaining flexor carpi  radialis tendon graft was then rolled onto itself and made into an anchovy ball which was sutured with 3-0 Ethibond so that it would not lose its shape.  The Arthrex corkscrew device sutures were then passed through this in such a fashion that the anchovy ball would be secured directly underneath the first metacarpal base and directly distal to the scaphoid distal pole.  This was tied down.  Wounds were then thoroughly irrigated.  The capsule of the thumb CMC joint was closed using 3-0 Ethibond in interrupted figure-of-eight fashion.  Tourniquet was brought down and tourniquet time was 129 minutes.  Wounds were closed in layers using 4 Monocryl to approximate the deep dermis in interrupted fashion and 4-0 nylon to approximate the skin in interrupted horizontal mattress fashion.  Counts were correct at the end of the case.  Short arm thumb spica splint was applied.  Patient was then awakened and transferred to the postoperative area in stable condition.     DISPOSITION: Home.    PLAN: Suture removal at 1.5 weeks postop with concurrent hand therapy appointment for splint fabrication.  4 weeks of immobilization in a custom thumb spica splint.  Then will begin hand therapy for 4 - 6 weeks.

## 2021-03-02 NOTE — DISCHARGE INSTRUCTIONS
[facility]   Adult Discharge Orders & Instructions     For 24 hours after surgery    1. Get plenty of rest.  A responsible adult must stay with you for at least 24 hours after you leave the hospital.   2. Do not drive or use heavy equipment.  If you have weakness or tingling, don't drive or use heavy equipment until this feeling goes away.  3. Do not drink alcohol.  4. Avoid strenuous or risky activities.  Ask for help when climbing stairs.   5. You may feel lightheaded.  IF so, sit for a few minutes before standing.  Have someone help you get up.   6. If you have nausea (feel sick to your stomach): Drink only clear liquids such as apple juice, ginger ale, broth or 7-Up.  Rest may also help.  Be sure to drink enough fluids.  Move to a regular diet as you feel able.  7. You may have a slight fever. Call the doctor if your fever is over 100 F (37.7 C) (taken under the tongue) or lasts longer than 24 hours.  8. You may have a dry mouth, a sore throat, muscle aches or trouble sleeping.  These should go away after 24 hours.  9. Do not make important or legal decisions.   Call your doctor for any of the followin.  Signs of infection (fever, growing tenderness at the surgery site, a large amount of drainage or bleeding, severe pain, foul-smelling drainage, redness, swelling).    2. It has been over 8 to 10 hours since surgery and you are still not able to urinate (pass water).    3.  Headache for over 24 hours.    4.  Numbness, tingling or weakness the day after surgery (if you had spinal anesthesia).  To contact a doctor, call __the clinic at [phone]_____________________________________

## 2021-03-02 NOTE — OR NURSING
"Pt's main issue was pain and nausea,but both issues under control. She appears calm and content. Taking Po well and voiding without any problem. Up walking in the rees with SBA.  Unable to palpate rt radial pulse but able to wiggle her fingers,tip of fingers are warm to touch,CMS is intact. Surgical dressing is clean and dry. Her right arm is in a sling with ice on it.   Pt met discharge criteria,and was discharged  In the care of her  \"Ellis\". Her daughter,\"Zainab was updated as well. 10 Oxycodone from Pharmacy sent with pt and a script was also sent home with pt.   "

## 2021-03-09 ENCOUNTER — ANCILLARY PROCEDURE (OUTPATIENT)
Dept: GENERAL RADIOLOGY | Facility: CLINIC | Age: 61
End: 2021-03-09
Attending: PLASTIC SURGERY
Payer: COMMERCIAL

## 2021-03-09 ENCOUNTER — OFFICE VISIT (OUTPATIENT)
Dept: ORTHOPEDICS | Facility: CLINIC | Age: 61
End: 2021-03-09
Payer: COMMERCIAL

## 2021-03-09 ENCOUNTER — THERAPY VISIT (OUTPATIENT)
Dept: OCCUPATIONAL THERAPY | Facility: CLINIC | Age: 61
End: 2021-03-09
Payer: COMMERCIAL

## 2021-03-09 DIAGNOSIS — M65.4 DE QUERVAIN'S DISEASE (RADIAL STYLOID TENOSYNOVITIS): ICD-10-CM

## 2021-03-09 DIAGNOSIS — M18.0 ARTHRITIS OF CARPOMETACARPAL (CMC) JOINTS OF BOTH THUMBS: Primary | ICD-10-CM

## 2021-03-09 DIAGNOSIS — M18.0 ARTHRITIS OF CARPOMETACARPAL (CMC) JOINTS OF BOTH THUMBS: ICD-10-CM

## 2021-03-09 DIAGNOSIS — M79.644 THUMB PAIN, RIGHT: Primary | ICD-10-CM

## 2021-03-09 PROCEDURE — 73140 X-RAY EXAM OF FINGER(S): CPT | Mod: RT | Performed by: RADIOLOGY

## 2021-03-09 PROCEDURE — 99024 POSTOP FOLLOW-UP VISIT: CPT | Performed by: PLASTIC SURGERY

## 2021-03-09 PROCEDURE — 97760 ORTHOTIC MGMT&TRAING 1ST ENC: CPT | Mod: GO | Performed by: OCCUPATIONAL THERAPIST

## 2021-03-09 NOTE — PROGRESS NOTES
Hand Therapy Initial Evaluation    Current Date:  3/9/2021    Diagnosis:   1.  Right de Quervain's tendinitis.  2.  Right thumb carpometacarpal joint arthritis.    DOS: 3/1/21  Procedure:    1.  Open release of right wrist first dorsal compartment.  2.  Right wrist trapeziectomy and flexor carpi radialis ligament reconstruction and tendon interposition for arthroplasty.  Post:  1w 1d    Per MD 3/9/2021  Hand therapy for custom thumb spica splint fabrication.  She is to be immobilized in the thumb spica position for another 3 weeks.  May shower with splint off.  In 3 weeks, patient is to begin range of motion exercises.  After 6 weeks postop, patient may begin strengthening exercises.      Subjective:  Bre Bush is a 61 year old right hand dominant female.    Patient reports symptoms of pain, stiffness/loss of motion, weakness/loss of strength, edema, of the R thumb, radial wrist. Since surgery symptoms are Gradually getting better Special tests:  x-ray.  Previous treatment: therapy for deQuervains; injection. General health as reported by patient is good.  Pertinent medical history includes:Thyroid Problems, reflux  Medical allergies:none.  Surgical history: orthopedic: (R) knee, (L) toe.  Medication history: Sleep, Thyroid, Prevacid.    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  Job Tasks: Computer Work, Prolonged Standing, Repetitive Tasks  Prior functional level:  no limitations  Barriers include:none  Mobility: No difficulty  Transportation: drives  Leisure activities/hobbies: golf, crocheting    Objective:  Pain Level (Scale 0-10):   3/12/2020 3/9/2021     At Rest 4-5/10 Mainly numbness, tingling   With Use 7/10 Less than 5 (at worst recently)     Pain Description:  Date 3/12/2020 3/9/2021     Location hand and thumb Wrist, thumb   Pain Quality Aching, throbbing and Sharp Mainly numbness, tingling, itchy   Frequency intermittent      Pain is worst   daytime    Exacerbated by  gripping, twisting, pinching, pulling, lifting    Relieved by NSAIDs and rest elevating   Progression Gradually worsening improving     VISUAL INSPECTION:  DATE 3/9/2021    Right   General posture of the upper extremity:  somewhat guarded, encouraged to no use sling   Joint alignment: [x]   Normal   []  Comments:   Other observations:  Incision areas to radial wrist - covered with steristrips. No evidence of infection.      Sensation   Still has decreased sensation to R thumb tip, radial thumb, volar radial hand. Does have sensation to ulnar side of thumb.    Edema   Mild. ecchymosis also to volar distal FA    ROM  Pain Report: - none  + mild    ++ moderate    +++ severe   Thumb   3/12/2020 3/12/2020 3/9/2021     AROM  (PROM) Right Left R   MP 60 53 NT   IP 60 60 ~45   RABD 45+ 50+ NT   PABD 45+ 50+ NT   Opposition 7/10+++ 7/10++ NT     Wrist 3/12/2020 3/12/2020 3/9/2021     AROM (PROM) Right Left R   Extension 70+ 75 NT   Flexion 65+ 65+ NT   RD 20+ 25 NT   UD 45+ 45 NT   UD with Th Flex 20++ 25+ NT     Assessment:  Patient presents with symptoms consistent with diagnosis of R thumb LRTI and 1st dorsal compartment release,  with surgical  intervention.     Patient's limitations or Problem List includes:  Pain, Decreased ROM/motion, Increased edema and Weakness of the right wrist, hand and thumb which interferes with the patient's ability to perform Self Care Tasks (dressing, eating), Work Tasks, Sleep Patterns, Recreational Activities, Household Chores and Driving  as compared to previous level of function.    Rehab Potential:  Excellent - Return to full activity, no limitations    Patient will benefit from skilled Occupational Therapy to increase ROM and decrease pain, edema and adherence of scarring to return to previous activity level and resume normal daily tasks and to reach their rehab potential.    Barriers to Learning:  No barrier    Communication Issues:  Patient appears to be  able to clearly communicate and understand verbal and written communication and follow directions correctly.    Chart Review: Brief history including review of medical and/or therapy records relating to the presenting problem and Simple history review with patient    Identified Performance Deficits: toileting, dressing, personal device care, hygiene and grooming, care of others, home establishment and management, meal preparation and cleanup, shopping, work and leisure activities    Assessment of Occupational Performance:  3-5 Performance Deficits    Clinical Decision Making (Complexity): Low complexity    Treatment Explanation:  The following has been discussed with the patient:  RX ordered/plan of care  Anticipated outcomes  Possible risks and side effects    Plan:  Frequency:  2 X a month, once daily  Duration:  for 1 month increasing to 1 X a week over 2 months (10)      Treatment Plan:   Modalities:  Fluidotherapy, Paraffin (when indicated, as progression of healing allows)  Therapeutic Exercise:  AROM, AAROM, PROM, Tendon Gliding, Blocking, Place and Hold, Isotonics, Isometrics, Stabilization (when indicated, as progression of healing allows)  Neuromuscular re-education:  Coordination/Dexterity and Proprioceptive Training  Manual Techniques:  Scar mobilization, Myofascial release and Manual edema mobilization  Orthotic Fabrication:  Static orthosis, Hand based orthosis and Forearm based orthosis  Self Care:  Self Care Tasks and Ergonomic Considerations    Discharge Plan:  Achieve all LTG.  Independent in home treatment program.  Reach maximal therapeutic benefit.    HEP:  Full time FA based thumb spica orthosis, IPj free  Per MD, pt can start motion after another 3 weeks.  Measure and charge for eval when appropriate    Note 3/9/2021:  Did not charge for eval (orthosis only this date)

## 2021-03-09 NOTE — PROGRESS NOTES
Patient returns for a postoperative follow-up.    INTERVAL HISTORY: Doing well.  Pain is tolerable.  Denies numbness or tingling.    PHYSICAL EXAMINATION:   Incisions well-healed.  No sign of infection.  Some volar wrist bruising.    IMAGING: X-ray imaging shows stable appearance of first metacarpal after trapeziectomy and LRTI.  No subsidence.    ASSESSMENT: 1 week status post right thumb trapeziectomy and FCR LRTI in addition to first dorsal compartment release.    PLAN: Sutures removed today.  Steri-Strips applied.  Hand therapy for custom thumb spica splint fabrication.  She is to be immobilized in the thumb spica position for another 3 weeks.  May shower with splint off.  In 3 weeks, patient is to begin range of motion exercises.  After 6 weeks postop, patient may begin strengthening exercises.  Return to see me in 2 months.    20 minutes spent on the date of the encounter performing chart review, history and physical, documentation, review of tests, communication with other healthcare professionals, and placing orders as noted above.

## 2021-03-09 NOTE — LETTER
3/9/2021         RE: Bre Bush  [address]  [facility] [address]        Dear Colleague,    Thank you for referring your patient, Bre Bush, to the [facility] [place]. Please see a copy of my visit note below.    Patient returns for a postoperative follow-up.    INTERVAL HISTORY: Doing well.  Pain is tolerable.  Denies numbness or tingling.    PHYSICAL EXAMINATION:   Incisions well-healed.  No sign of infection.  Some volar wrist bruising.    IMAGING: X-ray imaging shows stable appearance of first metacarpal after trapeziectomy and LRTI.  No subsidence.    ASSESSMENT: 1 week status post right thumb trapeziectomy and FCR LRTI in addition to first dorsal compartment release.    PLAN: Sutures removed today.  Steri-Strips applied.  Hand therapy for custom thumb spica splint fabrication.  She is to be immobilized in the thumb spica position for another 3 weeks.  May shower with splint off.  In 3 weeks, patient is to begin range of motion exercises.  After 6 weeks postop, patient may begin strengthening exercises.  Return to see me in 2 months.    20 minutes spent on the date of the encounter performing chart review, history and physical, documentation, review of tests, communication with other healthcare professionals, and placing orders as noted above.    Jon Matthew MD

## 2021-03-09 NOTE — NURSING NOTE
Reason For Visit:   Chief Complaint   Patient presents with     RECHECK     DOS 3/1/21 Right thumb trapeziectomy and arthroplasty, right first dorsal compartment release       Primary MD: Ha Will    Age: 61 year old    ?  No      There were no vitals taken for this visit.      Pain Assessment  Patient Currently in Pain: Denies(Numbness)  Primary Pain Location: Hand(Right)               QuickDASH Assessment  QuickDASH Main 5/5/2015   1. Open a tight or new jar. 2   2. Do heavy household chores (e.g., wash walls, floors). 2   3. Carry a shopping bag or briefcase. 2   4. Wash your back. 2   5. Use a knife to cut food. 1   6. Recreational activities in which you take some force or impact through your arm, shoulder or hand (e.g., golf, hammering, tennis, etc.). 2   7. During the past week, to what extent has your arm, shoulder or hand problem interfered with your normal social activities with family, friends, neighbours or groups? 2   8. During the past week, were you limited in your work or other regular daily activities as a result of your arm, shoulder or hand problem? 2   9. Arm, shoulder or hand pain. 2   10. Tingling (pins and needles) in your arm,shoulder or hand. 1   11. During the past week, how much difficulty have you had sleeping because of the pain in your arm, shoulder or hand? ([place] number) 3   SUM: 21          Current Outpatient Medications   Medication Sig Dispense Refill     hydrOXYzine (VISTARIL) 25 MG capsule Take 1 capsule (25 mg) by mouth every 6 hours as needed for itching 30 capsule 0     Lansoprazole (PREVACID PO) Take 30 mg by mouth 2 times daily        Levothyroxine Sodium (SYNTHROID PO) Take 75 mcg by mouth daily        oxyCODONE (ROXICODONE) 5 MG tablet Take 1 tablet (5 mg) by mouth every 6 hours as needed for breakthrough pain 20 tablet 0     senna-docusate (SENOKOT-S/PERICOLACE) 8.6-50 MG tablet Take 1-2 tablets by mouth 2 times daily 30 tablet 0     Zolpidem Tartrate  (AMBIEN PO) Take by mouth nightly as needed          No Known Allergies    Tg Barr, ATC

## 2021-03-17 ENCOUNTER — THERAPY VISIT (OUTPATIENT)
Dept: OCCUPATIONAL THERAPY | Facility: CLINIC | Age: 61
End: 2021-03-17
Payer: COMMERCIAL

## 2021-03-17 DIAGNOSIS — M18.0 ARTHRITIS OF CARPOMETACARPAL (CMC) JOINTS OF BOTH THUMBS: Primary | ICD-10-CM

## 2021-03-17 DIAGNOSIS — M79.644 THUMB PAIN, RIGHT: ICD-10-CM

## 2021-03-17 PROCEDURE — 97763 ORTHC/PROSTC MGMT SBSQ ENC: CPT | Mod: GO | Performed by: OCCUPATIONAL THERAPIST

## 2021-03-29 ENCOUNTER — TELEPHONE (OUTPATIENT)
Dept: ORTHOPEDICS | Facility: CLINIC | Age: 61
End: 2021-03-29

## 2021-03-29 NOTE — LETTER
2021      RE: Bre Bush  [address]  [place] [address]   1960  Employee # [id number]  Case Leave # [id number]         To Whom it May Concern,      Bre Bush is recovering from surgery.  She had an open release of her right wrist first dorsal compartment, and a right wrist trapeziectomy and flexor carpi radialis ligament reconstruction and tendon interposition for arthroplasty on 2021.    Bre's original plan was to return to work as of May 1, 2021, however this date has now been changed to a new return to work date of after Tuesday May 11, 2021.      May 11,2021 is the earliest date I am able to see Bre and clear her for work, and is also the earliest date available to still accommodate her need for healing and OT sessions.  Please extend her new return to work date to reflect this.        Sincerely,      Jon Matthew MD

## 2021-03-29 NOTE — TELEPHONE ENCOUNTER
Patient called RN to discuss a new return to work date.  The original plan was for her to return to work on 5-1-21, but with the appointments and therapy she is not able to get the final clearance from Dr Matthew until her appointment on 5-11-21.    She simply needs a new note outlining this new date faxed to her work.    Attn: My leave, fax # [phone]  Employee # [id number]  Case leave # [id number].  Letter faxed. Margoth Ritchie RN

## 2021-04-06 ENCOUNTER — THERAPY VISIT (OUTPATIENT)
Dept: OCCUPATIONAL THERAPY | Facility: CLINIC | Age: 61
End: 2021-04-06
Payer: COMMERCIAL

## 2021-04-06 DIAGNOSIS — M65.4 DE QUERVAIN'S DISEASE (RADIAL STYLOID TENOSYNOVITIS): ICD-10-CM

## 2021-04-06 DIAGNOSIS — M79.644 THUMB PAIN, RIGHT: ICD-10-CM

## 2021-04-06 DIAGNOSIS — M18.0 ARTHRITIS OF CARPOMETACARPAL (CMC) JOINTS OF BOTH THUMBS: Primary | ICD-10-CM

## 2021-04-06 PROCEDURE — 97140 MANUAL THERAPY 1/> REGIONS: CPT | Mod: GO | Performed by: OCCUPATIONAL THERAPIST

## 2021-04-06 PROCEDURE — 97110 THERAPEUTIC EXERCISES: CPT | Mod: GO | Performed by: OCCUPATIONAL THERAPIST

## 2021-04-06 PROCEDURE — 97763 ORTHC/PROSTC MGMT SBSQ ENC: CPT | Mod: GO | Performed by: OCCUPATIONAL THERAPIST

## 2021-04-06 PROCEDURE — 97165 OT EVAL LOW COMPLEX 30 MIN: CPT | Mod: GO | Performed by: OCCUPATIONAL THERAPIST

## 2021-04-06 NOTE — PROGRESS NOTES
SOAP Note - Hand Therapy - Objective Information    Current Date:  4/6/2021  Diagnosis:   1.  Right de Quervain's tendinitis.  2.  Right thumb carpometacarpal joint arthritis.    DOS: 3/1/21  Procedure:    1.  Open release of right wrist first dorsal compartment.  2.  Right wrist trapeziectomy and flexor carpi radialis ligament reconstruction and tendon interposition for arthroplasty.  Post:  5w 1d    Per MD 3/9/2021  Hand therapy for custom thumb spica splint fabrication.  She is to be immobilized in the thumb spica position for another 3 weeks.  May shower with splint off.  In 3 weeks, patient is to begin range of motion exercises.  After 6 weeks postop, patient may begin strengthening exercises.      Bre Bush is a 61 year old right hand dominant female.    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[employer]    S:  Subjective changes as noted by patient: The thumb is just doing OK.  I have been having some pain  Functional changes noted by patient: no changes still in splint    Upper Extremity Functional Index Score:  SCORE:   Column Totals: /80: 17   (A lower score indicates greater disability.)      O:  Pain Level (Scale 0-10):   3/12/2020 3/9/2021   4/6/21   At Rest 4-5/10 Mainly numbness, tingling 3/10   With Use 7/10 Less than 5 (at worst recently) 4-5/10     Pain Description:  Date 3/12/2020 3/9/2021     Location hand and thumb Wrist, thumb   Pain Quality Aching, throbbing and Sharp Mainly numbness, tingling, itchy   Frequency intermittent      Pain is worst  daytime    Exacerbated by  gripping, twisting, pinching, pulling, lifting    Relieved by NSAIDs and rest elevating   Progression Gradually worsening improving     VISUAL INSPECTION:  DATE 3/9/2021    Right   General posture of the upper extremity:  somewhat guarded, encouraged to no use sling   Joint alignment: [x]   Normal   []  Comments:   Other observations:  Incision areas to radial wrist - covered with steristrips. No  evidence of infection.      Sensation   Still has decreased sensation to R thumb tip, radial thumb, volar radial hand. Does have sensation to ulnar side of thumb.    Edema   Mild. ecchymosis also to volar distal FA    ROM  Pain Report: - none  + mild    ++ moderate    +++ severe   Thumb   3/12/2020 3/12/2020 3/9/2021   4/6/21   AROM  (PROM) Right Left R R   MP 60 53 NT 20   IP 60 60 ~45 50   RABD 45+ 50+ NT 35   PABD 45+ 50+ NT 40   Opposition 7/10+++ 7/10++ NT 3/10     Wrist 3/12/2020 3/12/2020 3/9/2021   4/6/21   AROM (PROM) Right Left R R   Extension 70+ 75 NT 40   Flexion 65+ 65+ NT 32   RD 20+ 25 NT 15   UD 45+ 45 NT 20   UD with Th Flex 20++ 25+ NT        65       90     Please refer to the daily flowsheet for treatment provided today.     HEP:  Full time FA based thumb spica orthosis, IPj free  AROM of wrist and thumb      Next Visit:   MFR/ scar massage  A/PROM of thumb and wrist  Transition to hand based orthosis

## 2021-04-14 ENCOUNTER — OFFICE VISIT (OUTPATIENT)
Dept: OCCUPATIONAL THERAPY | Facility: CLINIC | Age: 61
End: 2021-04-14
Payer: COMMERCIAL

## 2021-04-14 DIAGNOSIS — M65.4 DE QUERVAIN'S DISEASE (RADIAL STYLOID TENOSYNOVITIS): ICD-10-CM

## 2021-04-14 DIAGNOSIS — M18.0 ARTHRITIS OF CARPOMETACARPAL (CMC) JOINTS OF BOTH THUMBS: Primary | ICD-10-CM

## 2021-04-14 DIAGNOSIS — M79.644 THUMB PAIN, RIGHT: ICD-10-CM

## 2021-04-14 PROCEDURE — 97140 MANUAL THERAPY 1/> REGIONS: CPT | Mod: GO | Performed by: OCCUPATIONAL THERAPIST

## 2021-04-14 PROCEDURE — 97110 THERAPEUTIC EXERCISES: CPT | Mod: GO | Performed by: OCCUPATIONAL THERAPIST

## 2021-04-14 NOTE — PROGRESS NOTES
SOAP Note - Hand Therapy - Objective Information    Current Date:  4/14/2021  Diagnosis:   1.  Right de Quervain's tendinitis.  2.  Right thumb carpometacarpal joint arthritis.    DOS: 3/1/21  Procedure:    1.  Open release of right wrist first dorsal compartment.  2.  Right wrist trapeziectomy and flexor carpi radialis ligament reconstruction and tendon interposition for arthroplasty.  Post:  6w 2d    Per MD 3/9/2021  Hand therapy for custom thumb spica splint fabrication.  She is to be immobilized in the thumb spica position for another 3 weeks.  May shower with splint off.  In 3 weeks, patient is to begin range of motion exercises.  After 6 weeks postop, patient may begin strengthening exercises.      Bre Bush is a 61 year old right hand dominant female.    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[employer]    S:  Subjective changes as noted by patient: see flow sheet  Functional changes noted by patient: no changes still in splint    Upper Extremity Functional Index Score:  SCORE:   Column Totals: /80: 17   (A lower score indicates greater disability.)      O:  Pain Level (Scale 0-10):   3/12/2020 3/9/2021   4/6/21   At Rest 4-5/10 Mainly numbness, tingling 3/10   With Use 7/10 Less than 5 (at worst recently) 4-5/10     Pain Description:  Date 3/12/2020 3/9/2021     Location hand and thumb Wrist, thumb   Pain Quality Aching, throbbing and Sharp Mainly numbness, tingling, itchy   Frequency intermittent      Pain is worst  daytime    Exacerbated by  gripping, twisting, pinching, pulling, lifting    Relieved by NSAIDs and rest elevating   Progression Gradually worsening improving     VISUAL INSPECTION:  DATE 3/9/2021    Right   General posture of the upper extremity:  somewhat guarded, encouraged to no use sling   Joint alignment: [x]   Normal   []  Comments:   Other observations:  Incision areas to radial wrist - covered with steristrips. No evidence of infection.      Sensation   Still has  decreased sensation to R thumb tip, radial thumb, volar radial hand. Does have sensation to ulnar side of thumb.    Edema   Mild. ecchymosis also to volar distal FA    ROM  Pain Report: - none  + mild    ++ moderate    +++ severe   Thumb   3/12/2020 3/12/2020 3/9/2021   4/6/21 4/14/21   AROM  (PROM) Right Left R R R   MP 60 53 NT 20 45   IP 60 60 ~45 50    RABD 45+ 50+ NT 35    PABD 45+ 50+ NT 40    Opposition 7/10+++ 7/10++ NT 3/10      Wrist 3/12/2020 3/12/2020 3/9/2021   4/6/21 4/14/21   AROM (PROM) Right Left R R R   Extension 70+ 75 NT 40 60   Flexion 65+ 65+ NT 32 45   RD 20+ 25 NT 15 15   UD 45+ 45 NT 20 25   UD with Th Flex 20++ 25+ NT         65        90      Please refer to the daily flowsheet for treatment provided today.     HEP:  Full timehand based thumb spica orthosis, IPj free  AROM of wrist and thumb      Next Visit:   MFR/ scar massage  A/PROM of thumb and wrist

## 2021-04-20 ENCOUNTER — THERAPY VISIT (OUTPATIENT)
Dept: OCCUPATIONAL THERAPY | Facility: CLINIC | Age: 61
End: 2021-04-20
Payer: COMMERCIAL

## 2021-04-20 DIAGNOSIS — M79.644 THUMB PAIN, RIGHT: ICD-10-CM

## 2021-04-20 DIAGNOSIS — M18.0 ARTHRITIS OF CARPOMETACARPAL (CMC) JOINTS OF BOTH THUMBS: Primary | ICD-10-CM

## 2021-04-20 DIAGNOSIS — M65.4 DE QUERVAIN'S DISEASE (RADIAL STYLOID TENOSYNOVITIS): ICD-10-CM

## 2021-04-20 PROCEDURE — 97530 THERAPEUTIC ACTIVITIES: CPT | Mod: GO | Performed by: OCCUPATIONAL THERAPIST

## 2021-04-20 PROCEDURE — 97140 MANUAL THERAPY 1/> REGIONS: CPT | Mod: GO | Performed by: OCCUPATIONAL THERAPIST

## 2021-04-20 PROCEDURE — 97110 THERAPEUTIC EXERCISES: CPT | Mod: GO | Performed by: OCCUPATIONAL THERAPIST

## 2021-04-20 NOTE — PROGRESS NOTES
SOAP Note - Hand Therapy - Objective Information    Current Date:  4/20/2021  Diagnosis:   1.  Right de Quervain's tendinitis.  2.  Right thumb carpometacarpal joint arthritis.    DOS: 3/1/21  Procedure:    1.  Open release of right wrist first dorsal compartment.  2.  Right wrist trapeziectomy and flexor carpi radialis ligament reconstruction and tendon interposition for arthroplasty.  Post:  7w 1d    Per MD 3/9/2021  Hand therapy for custom thumb spica splint fabrication.  She is to be immobilized in the thumb spica position for another 3 weeks.  May shower with splint off.  In 3 weeks, patient is to begin range of motion exercises.  After 6 weeks postop, patient may begin strengthening exercises.      Bre Bush is a 61 year old right hand dominant female.    Occupational Profile Information:  Current occupation is "[employer]"    S:  Subjective changes as noted by patient: The progress is slow but I'm plugging along  Functional changes noted by patient: no changes still in splint      O:  Pain Level (Scale 0-10):   3/12/2020 3/9/2021   4/6/21 4/20/21   At Rest 4-5/10 Mainly numbness, tingling 3/10    With Use 7/10 Less than 5 (at worst recently) 4-5/10 5/10     Pain Description:  Date 3/12/2020 3/9/2021     Location hand and thumb Wrist, thumb   Pain Quality Aching, throbbing and Sharp Mainly numbness, tingling, itchy   Frequency intermittent      Pain is worst  daytime    Exacerbated by  gripping, twisting, pinching, pulling, lifting    Relieved by NSAIDs and rest elevating   Progression Gradually worsening improving     VISUAL INSPECTION:  DATE 3/9/2021    Right   General posture of the upper extremity:  somewhat guarded, encouraged to no use sling   Joint alignment: [x]   Normal   []  Comments:   Other observations:  Incision areas to radial wrist - covered with steristrips. No evidence of infection.      Sensation   Still has decreased sensation to R thumb tip, radial thumb, volar radial hand. Does have  sensation to ulnar side of thumb.    Edema   Mild. ecchymosis also to volar distal FA    ROM  Pain Report: - none  + mild    ++ moderate    +++ severe   Thumb   3/12/2020 3/12/2020 3/9/2021   4/6/21 4/14/21 4/20/21   AROM  (PROM) Right Left R R R R   MP 60 53 NT 20 45 35   IP 60 60 ~45 50  55   RABD 45+ 50+ NT 35  38   PABD 45+ 50+ NT 40  45   Opposition 7/10+++ 7/10++ NT 3/10  7/10+     Wrist 3/12/2020 3/12/2020 3/9/2021   4/6/21 4/14/21   AROM (PROM) Right Left R R R   Extension 70+ 75 NT 40 60   Flexion 65+ 65+ NT 32 45   RD 20+ 25 NT 15 15   UD 45+ 45 NT 20 25   UD with Th Flex 20++ 25+ NT         65        90      Strength   Painfree (Measured in pounds)  Pain Report:  + mild    ++ moderate    +++ severe    4/20/2021 4/20/2021   Trials Left Right   1  2  3 31  34  32 14   Average 32      Lat Pinch 4/20/2021 4/20/2021   Trials Left Right   1  2  3 7 0   Average       3 Pt Pinch 4/20/2021 4/20/2021   Trials Left Right   1  2  3 1+++ 2   Average         Please refer to the daily flowsheet for treatment provided today.     HEP:  Full timehand based thumb spica orthosis, IPj free  AROM of wrist and thumb   strengthening    Next Visit:   MFR/ scar massage  A/PROM of thumb and wrist  strengthening

## 2021-04-27 ENCOUNTER — THERAPY VISIT (OUTPATIENT)
Dept: OCCUPATIONAL THERAPY | Facility: CLINIC | Age: 61
End: 2021-04-27
Payer: COMMERCIAL

## 2021-04-27 DIAGNOSIS — M18.0 ARTHRITIS OF CARPOMETACARPAL (CMC) JOINTS OF BOTH THUMBS: Primary | ICD-10-CM

## 2021-04-27 DIAGNOSIS — M79.644 THUMB PAIN, RIGHT: ICD-10-CM

## 2021-04-27 DIAGNOSIS — M65.4 DE QUERVAIN'S DISEASE (RADIAL STYLOID TENOSYNOVITIS): ICD-10-CM

## 2021-04-27 PROCEDURE — 97140 MANUAL THERAPY 1/> REGIONS: CPT | Mod: GO | Performed by: OCCUPATIONAL THERAPIST

## 2021-04-27 PROCEDURE — 97530 THERAPEUTIC ACTIVITIES: CPT | Mod: GO | Performed by: OCCUPATIONAL THERAPIST

## 2021-04-27 PROCEDURE — 97110 THERAPEUTIC EXERCISES: CPT | Mod: GO | Performed by: OCCUPATIONAL THERAPIST

## 2021-04-27 NOTE — PROGRESS NOTES
SOAP Note - Hand Therapy - Objective Information    Current Date:  4/27/2021  Diagnosis:   1.  Right de Quervain's tendinitis.  2.  Right thumb carpometacarpal joint arthritis.    DOS: 3/1/21  Procedure:    1.  Open release of right wrist first dorsal compartment.  2.  Right wrist trapeziectomy and flexor carpi radialis ligament reconstruction and tendon interposition for arthroplasty.  Post:  8w 1d    Per MD 3/9/2021  Hand therapy for custom thumb spica splint fabrication.  She is to be immobilized in the thumb spica position for another 3 weeks.  May shower with splint off.  In 3 weeks, patient is to begin range of motion exercises.  After 6 weeks postop, patient may begin strengthening exercises.      Bre Bush is a 61 year old right hand dominant female.    Occupational Profile Information:  Current occupation is [employer]    S:  Subjective changes as noted by patient: The wrist is more painful and sensitive to touch.  Functional changes noted by patient: no changes still in splint      O:  Pain Level (Scale 0-10):   3/12/2020 3/9/2021   4/6/21 4/20/21 4/27/21   At Rest 4-5/10 Mainly numbness, tingling 3/10     With Use 7/10 Less than 5 (at worst recently) 4-5/10 5/10 5/10     Pain Description:  Date 3/12/2020 3/9/2021     Location hand and thumb Wrist, thumb   Pain Quality Aching, throbbing and Sharp Mainly numbness, tingling, itchy   Frequency intermittent      Pain is worst  daytime    Exacerbated by  gripping, twisting, pinching, pulling, lifting    Relieved by NSAIDs and rest elevating   Progression Gradually worsening improving     VISUAL INSPECTION:  DATE 3/9/2021    Right   General posture of the upper extremity:  somewhat guarded, encouraged to no use sling   Joint alignment: [x]   Normal   []  Comments:   Other observations:  Incision areas to radial wrist - covered with steristrips. No evidence of infection.      Sensation   Still has decreased sensation to R thumb tip, radial thumb, volar  radial hand. Does have sensation to ulnar side of thumb.    Edema   mild    ROM  Pain Report: - none  + mild    ++ moderate    +++ severe   Thumb   3/12/2020 3/12/2020 3/9/2021   4/6/21 4/14/21 4/20/21   AROM  (PROM) Right Left R R R R   MP 60 53 NT 20 45 35   IP 60 60 ~45 50  55   RABD 45+ 50+ NT 35  38   PABD 45+ 50+ NT 40  45   Opposition 7/10+++ 7/10++ NT 3/10  7/10+     Wrist 3/12/2020 3/12/2020 3/9/2021   4/6/21 4/14/21 4/27/21   AROM (PROM) Right Left R R R R   Extension 70+ 75 NT 40 60 70   Flexion 65+ 65+ NT 32 45 50   RD 20+ 25 NT 15 15 20   UD 45+ 45 NT 20 25 30   UD with Th Flex 20++ 25+ NT      Supination    65     Pronation    90       Strength   Painfree (Measured in pounds)  Pain Report:  + mild    ++ moderate    +++ severe    4/20/2021 4/20/2021   Trials Left Right   1  2  3 31  34  32 14   Average 32      Lat Pinch 4/20/2021 4/20/2021   Trials Left Right   1  2  3 7 0   Average       3 Pt Pinch 4/20/2021 4/20/2021   Trials Left Right   1  2  3 1+++ 2   Average       Palpation: severe tenderness at radial wrist  Please refer to the daily flowsheet for treatment provided today.     HEP:  Full timehand based thumb spica orthosis, IPj free  AROM of wrist and thumb   strengthening  Desensitization    Next Visit:   MFR/ scar massage  A/PROM of thumb and wrist  Strengthening

## 2021-05-06 DIAGNOSIS — M18.0 ARTHRITIS OF CARPOMETACARPAL (CMC) JOINTS OF BOTH THUMBS: Primary | ICD-10-CM

## 2021-05-11 ENCOUNTER — OFFICE VISIT (OUTPATIENT)
Dept: ORTHOPEDICS | Facility: CLINIC | Age: 61
End: 2021-05-11
Payer: COMMERCIAL

## 2021-05-11 ENCOUNTER — THERAPY VISIT (OUTPATIENT)
Dept: OCCUPATIONAL THERAPY | Facility: CLINIC | Age: 61
End: 2021-05-11
Payer: COMMERCIAL

## 2021-05-11 ENCOUNTER — ANCILLARY PROCEDURE (OUTPATIENT)
Dept: GENERAL RADIOLOGY | Facility: CLINIC | Age: 61
End: 2021-05-11
Attending: PLASTIC SURGERY
Payer: COMMERCIAL

## 2021-05-11 DIAGNOSIS — M65.4 DE QUERVAIN'S DISEASE (RADIAL STYLOID TENOSYNOVITIS): ICD-10-CM

## 2021-05-11 DIAGNOSIS — M18.0 ARTHRITIS OF CARPOMETACARPAL (CMC) JOINTS OF BOTH THUMBS: ICD-10-CM

## 2021-05-11 DIAGNOSIS — M18.0 ARTHRITIS OF CARPOMETACARPAL (CMC) JOINTS OF BOTH THUMBS: Primary | ICD-10-CM

## 2021-05-11 DIAGNOSIS — M79.644 THUMB PAIN, RIGHT: ICD-10-CM

## 2021-05-11 PROCEDURE — 97110 THERAPEUTIC EXERCISES: CPT | Mod: GO | Performed by: OCCUPATIONAL THERAPIST

## 2021-05-11 PROCEDURE — 97530 THERAPEUTIC ACTIVITIES: CPT | Mod: GO | Performed by: OCCUPATIONAL THERAPIST

## 2021-05-11 PROCEDURE — 99024 POSTOP FOLLOW-UP VISIT: CPT | Performed by: PLASTIC SURGERY

## 2021-05-11 PROCEDURE — 73140 X-RAY EXAM OF FINGER(S): CPT | Mod: RT | Performed by: RADIOLOGY

## 2021-05-11 PROCEDURE — 97140 MANUAL THERAPY 1/> REGIONS: CPT | Mod: GO | Performed by: OCCUPATIONAL THERAPIST

## 2021-05-11 NOTE — PROGRESS NOTES
SOAP Note - Hand Therapy - Objective Information    Current Date:  5/11/2021  Diagnosis:   1.  Right de Quervain's tendinitis.  2.  Right thumb carpometacarpal joint arthritis.    DOS: 3/1/21  Procedure:    1.  Open release of right wrist first dorsal compartment.  2.  Right wrist trapeziectomy and flexor carpi radialis ligament reconstruction and tendon interposition for arthroplasty.  Post:  10w 1d    Per MD 3/9/2021  Hand therapy for custom thumb spica splint fabrication.  She is to be immobilized in the thumb spica position for another 3 weeks.  May shower with splint off.  In 3 weeks, patient is to begin range of motion exercises.  After 6 weeks postop, patient may begin strengthening exercises.      Bre Bush is a 61 year old right hand dominant female.    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 Scheduled to return to work on 6/1/21      O:  Pain Level (Scale 0-10):   3/12/2020 3/9/2021   4/6/21 4/20/21 4/27/21 5/11/21   At Rest 4-5/10 Mainly numbness, tingling 3/10      With Use 7/10 Less than 5 (at worst recently) 4-5/10 5/10 5/10 5/10     Pain Description:  Date 3/12/2020 3/9/2021     Location hand and thumb Wrist, thumb   Pain Quality Aching, throbbing and Sharp Mainly numbness, tingling, itchy   Frequency intermittent      Pain is worst  daytime    Exacerbated by  gripping, twisting, pinching, pulling, lifting    Relieved by NSAIDs and rest elevating   Progression Gradually worsening improving     VISUAL INSPECTION:  DATE 3/9/2021    Right   General posture of the upper extremity:  somewhat guarded, encouraged to no use sling   Joint alignment: [x]   Normal   []  Comments:   Other observations:  Incision areas to radial wrist - covered with steristrips. No evidence of infection.      Sensation   Still has decreased  sensation to R thumb tip, radial thumb, volar radial hand. Does have sensation to ulnar side of thumb.    Edema   mild    ROM  Pain Report: - none  + mild    ++ moderate    +++ severe   Thumb   3/12/2020 3/12/2020 3/9/2021   4/6/21 4/14/21 4/20/21 5/11/21   AROM  (PROM) Right Left R R R R R   MP 60 53 NT 20 45 35    IP 60 60 ~45 50  55    RABD 45+ 50+ NT 35  38    PABD 45+ 50+ NT 40  45    Opposition 7/10+++ 7/10++ NT 3/10  7/10+ 8/10     Wrist 3/12/2020 3/12/2020 3/9/2021   4/6/21 4/14/21 4/27/21 5/11/21   AROM (PROM) Right Left R R R R R   Extension 70+ 75 NT 40 60 70 70   Flexion 65+ 65+ NT 32 45 50 65   RD 20+ 25 NT 15 15 20 20   UD 45+ 45 NT 20 25 30 40   UD with Th Flex 20++ 25+ NT       Supination    65      Pronation    90        Strength   Painfree (Measured in pounds)  Pain Report:  + mild    ++ moderate    +++ severe    4/20/2021 4/20/2021 5/11/21   Trials Left Right Right   1  2  3 31  34  32 14 49  49  41   Average 32  47     Lat Pinch 4/20/2021 4/20/2021 5/11/21   Trials Left Right Right   1  2  3 7 0 9   Average        3 Pt Pinch 4/20/2021 4/20/2021 5/11/21   Trials Left Right Right   1  2  3 1+++ 2 8+ wrist   Average        Palpation: severe tenderness at radial wrist  Please refer to the daily flowsheet for treatment provided today.     HEP:  Full timehand based thumb spica orthosis, IPj free  AROM of wrist and thumb   strengthening  Desensitization  Wrist strengthening    Next Visit:   MFR/ scar massage  A/PROM of thumb and wrist  Strengthening

## 2021-05-11 NOTE — NURSING NOTE
Reason For Visit:   Chief Complaint   Patient presents with     RECHECK     DOS 3/1/21 Right thumb trapeziectomy and arthroplasty, right first dorsal compartment release       Primary MD: Ha Will    Age: 61 year old    ?  No      There were no vitals taken for this visit.      Pain Assessment  Patient Currently in Pain: Yes  0-10 Pain Scale: 3  Primary Pain Location: Wrist(Right)               QuickDASH Assessment  QuickDASH Main 5/5/2015   1. Open a tight or new jar. 2   2. Do heavy household chores (e.g., wash walls, floors). 2   3. Carry a shopping bag or briefcase. 2   4. Wash your back. 2   5. Use a knife to cut food. 1   6. Recreational activities in which you take some force or impact through your arm, shoulder or hand (e.g., golf, hammering, tennis, etc.). 2   7. During the past week, to what extent has your arm, shoulder or hand problem interfered with your normal social activities with family, friends, neighbours or groups? 2   8. During the past week, were you limited in your work or other regular daily activities as a result of your arm, shoulder or hand problem? 2   9. Arm, shoulder or hand pain. 2   10. Tingling (pins and needles) in your arm,shoulder or hand. 1   11. During the past week, how much difficulty have you had sleeping because of the pain in your arm, shoulder or hand? (Cheesh-Na number) 3   SUM: 21          Current Outpatient Medications   Medication Sig Dispense Refill     Lansoprazole (PREVACID PO) Take 30 mg by mouth 2 times daily        Levothyroxine Sodium (SYNTHROID PO) Take 75 mcg by mouth daily        Zolpidem Tartrate (AMBIEN PO) Take by mouth nightly as needed        hydrOXYzine (VISTARIL) 25 MG capsule Take 1 capsule (25 mg) by mouth every 6 hours as needed for itching 30 capsule 0     oxyCODONE (ROXICODONE) 5 MG tablet Take 1 tablet (5 mg) by mouth every 6 hours as needed for breakthrough pain 20 tablet 0     senna-docusate (SENOKOT-S/PERICOLACE) 8.6-50 MG tablet  Take 1-2 tablets by mouth 2 times daily 30 tablet 0       No Known Allergies    Tg Barr, ATC

## 2021-05-11 NOTE — PROGRESS NOTES
Patient returns for a postoperative follow-up.    INTERVAL HISTORY: Doing well.  Pain is tolerable.  Denies numbness or tingling.    PHYSICAL EXAMINATION:   Incisions well-healed.  Right thumb range of motion normal able to oppose the small finger.  Wrist range of motion normal.  Minimal tenderness.    IMAGING: X-ray imaging shows stable appearance of first metacarpal after trapeziectomy and LRTI.  No subsidence.    ASSESSMENT: 2.5 months status post right thumb trapeziectomy and FCR LRTI in addition to first dorsal compartment release.    PLAN: Work letter given.  For now we will plan for return to work full-time on June 1, 2021.      We discussed my upcoming departure from [facility].  Referral to Dr. Mujica for left thumb CMC arthritis that did not respond well to steroid injection.  Return to see Dr. Cedeno for rehabilitation of right thumb status post trapeziectomy and LRTI as well as first dorsal compartment release.     20 minutes spent on the date of the encounter performing chart review, history and physical, documentation, review of tests, communication with other healthcare professionals, and placing orders as noted above.

## 2021-05-11 NOTE — LETTER
Return to Work  May 11, 2021     Seen today: yes    Patient:  Bre Bush  :   1960  MRN:     [id number]  Physician: JON SADLER may return to work on Date: 2021      The next clinic appointment is scheduled for (date/time) as needed.    Patient limitations:      May return to work on 2021 with no restrictions.            Electronically signed by Jon Sadler MD

## 2021-05-11 NOTE — LETTER
5/11/2021         RE: Bre Bush  [address]  [facility] [address]        Dear Colleague,    Thank you for referring your patient, Bre Bush, to the [facility] [place]. Please see a copy of my visit note below.    Patient returns for a postoperative follow-up.    INTERVAL HISTORY: Doing well.  Pain is tolerable.  Denies numbness or tingling.    PHYSICAL EXAMINATION:   Incisions well-healed.  Right thumb range of motion normal able to oppose the small finger.  Wrist range of motion normal.  Minimal tenderness.    IMAGING: X-ray imaging shows stable appearance of first metacarpal after trapeziectomy and LRTI.  No subsidence.    ASSESSMENT: 2.5 months status post right thumb trapeziectomy and FCR LRTI in addition to first dorsal compartment release.    PLAN: Work letter given.  For now we will plan for return to work full-time on June 1, 2021.      We discussed my upcoming departure from [facility].  Referral to Dr. Mujica for left thumb CMC arthritis that did not respond well to steroid injection.  Return to see Dr. Cedeno for rehabilitation of right thumb status post trapeziectomy and LRTI as well as first dorsal compartment release.     20 minutes spent on the date of the encounter performing chart review, history and physical, documentation, review of tests, communication with other healthcare professionals, and placing orders as noted above.    Jon Matthew MD

## 2021-05-11 NOTE — LETTER
Return to Work  May 11, 2021     Seen today: yes    Patient:  Bre Bush  :   1960  MRN:     [id number]  Physician: JON SADLER may return to work on Date: 2021.      The next clinic appointment is scheduled for (date/time) as needed.    Patient limitations until 2021:      1.  Right wrist and thumb in splint at all times.  2.  No lifting with right hand or wrist.  3.  Right hand and wrist as assist only.  4.  Part-time work only.  5.  May return to work with no restrictions on 2021 unless otherwise specified with another work letter.          Electronically signed by Jon Sadler MD

## 2021-05-12 NOTE — TELEPHONE ENCOUNTER
RECORDS RECEIVED FROM: L thumb Arthritis of carpometacarpal (CMC)   De Quervain's disease (radial styloid tenosynovitis   DATE RECEIVED: Aug 11, 2021     NOTES STATUS DETAILS   OFFICE NOTE from referring provider Internal  Jon Matthew MD        OFFICE NOTE from other specialist N/A    DISCHARGE SUMMARY from hospital N/A    DISCHARGE REPORT from the ER N/A    OPERATIVE REPORT INTERNAL 3/1/21   MEDICATION LIST Internal    IMPLANT RECORD/STICKER N/A    LABS     CBC/DIFF N/A    CULTURES N/A    INJECTIONS DONE IN RADIOLOGY N/A    MRI Internal    CT SCAN N/A    XRAYS (IMAGES & REPORTS) Internal    TUMOR     PATHOLOGY  Slides & report N/A      05/12/21   2:31 PM   COMPLETE  Sadia Almanza CMA

## 2021-05-18 ENCOUNTER — THERAPY VISIT (OUTPATIENT)
Dept: OCCUPATIONAL THERAPY | Facility: CLINIC | Age: 61
End: 2021-05-18
Payer: COMMERCIAL

## 2021-05-18 DIAGNOSIS — M65.4 DE QUERVAIN'S DISEASE (RADIAL STYLOID TENOSYNOVITIS): ICD-10-CM

## 2021-05-18 DIAGNOSIS — M18.0 ARTHRITIS OF CARPOMETACARPAL (CMC) JOINTS OF BOTH THUMBS: Primary | ICD-10-CM

## 2021-05-18 DIAGNOSIS — M79.644 THUMB PAIN, RIGHT: ICD-10-CM

## 2021-05-18 PROCEDURE — 97110 THERAPEUTIC EXERCISES: CPT | Mod: GO | Performed by: OCCUPATIONAL THERAPIST

## 2021-05-18 PROCEDURE — 97140 MANUAL THERAPY 1/> REGIONS: CPT | Mod: GO | Performed by: OCCUPATIONAL THERAPIST

## 2021-05-18 PROCEDURE — 97530 THERAPEUTIC ACTIVITIES: CPT | Mod: GO | Performed by: OCCUPATIONAL THERAPIST

## 2021-05-27 ENCOUNTER — THERAPY VISIT (OUTPATIENT)
Dept: OCCUPATIONAL THERAPY | Facility: CLINIC | Age: 61
End: 2021-05-27
Payer: COMMERCIAL

## 2021-05-27 DIAGNOSIS — M18.0 ARTHRITIS OF CARPOMETACARPAL (CMC) JOINTS OF BOTH THUMBS: Primary | ICD-10-CM

## 2021-05-27 DIAGNOSIS — M79.644 THUMB PAIN, RIGHT: ICD-10-CM

## 2021-05-27 DIAGNOSIS — M65.4 DE QUERVAIN'S DISEASE (RADIAL STYLOID TENOSYNOVITIS): ICD-10-CM

## 2021-05-27 PROCEDURE — 97530 THERAPEUTIC ACTIVITIES: CPT | Mod: GO | Performed by: OCCUPATIONAL THERAPIST

## 2021-05-27 PROCEDURE — 97140 MANUAL THERAPY 1/> REGIONS: CPT | Mod: GO | Performed by: OCCUPATIONAL THERAPIST

## 2021-05-27 PROCEDURE — 97110 THERAPEUTIC EXERCISES: CPT | Mod: GO | Performed by: OCCUPATIONAL THERAPIST

## 2021-05-27 NOTE — PROGRESS NOTES
Progress Note - Hand Therapy    Current Date:  5/27/2021  Reporting Period: 3/12/21 to 5/27/21  Diagnosis:   1.  Right de Quervain's tendinitis.  2.  Right thumb carpometacarpal joint arthritis.    DOS: 3/1/21  Procedure:    1.  Open release of right wrist first dorsal compartment.  2.  Right wrist trapeziectomy and flexor carpi radialis ligament reconstruction and tendon interposition for arthroplasty.  Post:  12w 1d    Bre Bush is a 61 year old right hand dominant female.    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  Some days are good and some days are bad.  It gets better as the day goes on.  Functional changes noted by patient: Using thumb and wrist more. Scheduled to return to work on 6/11/21    Upper Extremity Functional Index Score:  SCORE:   Column Totals: /80: 38   (A lower score indicates greater disability.)      O:  Pain Level (Scale 0-10):   3/12/2020 3/9/2021   4/6/21 4/20/21 4/27/21 5/11/21 5/27/21   At Rest 4-5/10 Mainly numbness, tingling 3/10       With Use 7/10 Less than 5 (at worst recently) 4-5/10 5/10 5/10 5/10 2/10     Pain Description:  Date 3/12/2020 3/9/2021     Location hand and thumb Wrist, thumb   Pain Quality Aching, throbbing and Sharp Mainly numbness, tingling, itchy   Frequency intermittent      Pain is worst  daytime    Exacerbated by  gripping, twisting, pinching, pulling, lifting    Relieved by NSAIDs and rest elevating   Progression Gradually worsening improving     VISUAL INSPECTION:  DATE 3/9/2021    Right   General posture of the upper extremity:  somewhat guarded, encouraged to no use sling   Joint alignment: [x]   Normal   []  Comments:   Other observations:  Incision areas to radial wrist - covered with steristrips. No evidence of infection.      Sensation   Still has decreased sensation to R thumb tip, radial thumb, volar radial hand. Does have sensation to  ulnar side of thumb.    Edema   mild    ROM  Pain Report: - none  + mild    ++ moderate    +++ severe   Thumb   3/12/2020 3/12/2020 3/9/2021   4/6/21 4/14/21 4/20/21 5/11/21 5/27/21   AROM  (PROM) Right Left R R R R R R   MP 60 53 NT 20 45 35     IP 60 60 ~45 50  55     RABD 45+ 50+ NT 35  38     PABD 45+ 50+ NT 40  45     Opposition 7/10+++ 7/10++ NT 3/10  7/10+ 8/10 9/10     Wrist 3/12/2020 3/12/2020 3/9/2021   4/6/21 4/14/21 4/27/21 5/11/21   AROM (PROM) Right Left R R R R R   Extension 70+ 75 NT 40 60 70 70   Flexion 65+ 65+ NT 32 45 50 65   RD 20+ 25 NT 15 15 20 20   UD 45+ 45 NT 20 25 30 40   UD with Th Flex 20++ 25+ NT       Supination    65      Pronation    90        Strength   Painfree (Measured in pounds)  Pain Report:  + mild    ++ moderate    +++ severe    4/20/2021 4/20/2021 5/11/21 5/27/21   Trials Left Right Right Right   1  2  3 31  34  32 14 49  49  41 46  54  46   Average 32  47 49     Lat Pinch 4/20/2021 4/20/2021 5/11/21 5/27/21   Trials Left Right Right Right   1  2  3 7 0 9 10   Average         3 Pt Pinch 4/20/2021 4/20/2021 5/11/21 5/27/21   Trials Left Right Right Right   1  2  3 1+++ 2 8+ wrist 10+   Average         Palpation: mild tenderness at radial wrist  Please refer to the daily flowsheet for treatment provided today.       Assessment:  Response to therapy has been improvement to:  ROM of Wrist:  All Planes  Thumb:  All Planes  Flexibility:  less tightness in involved muscles  Strength:   and pinch  Edema:  decreased  Pain:  frequency is less, intensity of pain is decreased, duration of pain is decreased and less tender over affected area  Sensitivity:  scar    Overall Assessment:  Patient's symptoms are resolving.  Patient is ready to progress to more complex exercises.  Patient is becoming more independent in home exercise program  Patient would benefit from continued therapy to achieve rehab potential  STG/LTG:  STGoals have been reviewed and progress or achievement has  occurred;  see goal sheet for details and updates.    I have re-evaluated this patient and find that the nature, scope, duration and intensity of the therapy is appropriate for the medical condition of the patient.  Plan:  Frequency/Duration:  Recommend continuing with the current treatment plan. 1 X week, once daily  for 7 weeks (total of 16 visits)    Recommendations for Continued Therapy  Additions to Treatment Plan -  Therapeutic Exercise:  Isotonics, Isometrics and Stabilization      HEP:  AROM of wrist and thumb   and pinch strengthening  Desensitization  Wrist strengthening    Next Visit:   MFR/ scar massage  A/PROM of thumb and wrist  Strengthening  Job simulation activities

## 2021-06-01 ENCOUNTER — THERAPY VISIT (OUTPATIENT)
Dept: OCCUPATIONAL THERAPY | Facility: CLINIC | Age: 61
End: 2021-06-01
Payer: COMMERCIAL

## 2021-06-01 DIAGNOSIS — M18.0 ARTHRITIS OF CARPOMETACARPAL (CMC) JOINTS OF BOTH THUMBS: Primary | ICD-10-CM

## 2021-06-01 DIAGNOSIS — M79.644 THUMB PAIN, RIGHT: ICD-10-CM

## 2021-06-01 DIAGNOSIS — M65.4 DE QUERVAIN'S DISEASE (RADIAL STYLOID TENOSYNOVITIS): ICD-10-CM

## 2021-06-01 PROCEDURE — 97140 MANUAL THERAPY 1/> REGIONS: CPT | Mod: GO | Performed by: OCCUPATIONAL THERAPIST

## 2021-06-01 PROCEDURE — 97110 THERAPEUTIC EXERCISES: CPT | Mod: GO | Performed by: OCCUPATIONAL THERAPIST

## 2021-06-01 PROCEDURE — 97530 THERAPEUTIC ACTIVITIES: CPT | Mod: GO | Performed by: OCCUPATIONAL THERAPIST

## 2021-06-09 ENCOUNTER — THERAPY VISIT (OUTPATIENT)
Dept: OCCUPATIONAL THERAPY | Facility: CLINIC | Age: 61
End: 2021-06-09
Payer: COMMERCIAL

## 2021-06-09 DIAGNOSIS — M65.4 DE QUERVAIN'S DISEASE (RADIAL STYLOID TENOSYNOVITIS): ICD-10-CM

## 2021-06-09 DIAGNOSIS — M18.0 ARTHRITIS OF CARPOMETACARPAL (CMC) JOINTS OF BOTH THUMBS: Primary | ICD-10-CM

## 2021-06-09 DIAGNOSIS — M79.644 THUMB PAIN, RIGHT: ICD-10-CM

## 2021-06-09 PROCEDURE — 97110 THERAPEUTIC EXERCISES: CPT | Mod: GO | Performed by: OCCUPATIONAL THERAPIST

## 2021-06-09 PROCEDURE — 97140 MANUAL THERAPY 1/> REGIONS: CPT | Mod: GO | Performed by: OCCUPATIONAL THERAPIST

## 2021-06-09 PROCEDURE — 97530 THERAPEUTIC ACTIVITIES: CPT | Mod: GO | Performed by: OCCUPATIONAL THERAPIST

## 2021-06-09 NOTE — PROGRESS NOTES
SOAP Note - Hand Therapy - Objective Information    Current Date:  6/9/2021    Diagnosis:   1.  Right de Quervain's tendinitis.  2.  Right thumb carpometacarpal joint arthritis.    DOS: 3/1/21  Procedure:    1.  Open release of right wrist first dorsal compartment.  2.  Right wrist trapeziectomy and flexor carpi radialis ligament reconstruction and tendon interposition for arthroplasty.  Post:  3 months    Bre Bush is a 61 year old right hand dominant female.    Occupational Profile Information:  Current occupation is RN [employer]    S:  Subjective changes as noted by patient: The thumb is fine but the wrist is not.  Functional changes noted by patient: Been using the hand normally as much as possible    O:  Pain Level (Scale 0-10):   3/12/2020 3/9/2021   4/6/21 4/20/21 4/27/21 5/11/21 5/27/21 6/9/21   At Rest 4-5/10 Mainly numbness, tingling 3/10        With Use 7/10 Less than 5 (at worst recently) 4-5/10 5/10 5/10 5/10 2/10 2/10     Pain Description:  Date 3/12/2020 3/9/2021     Location hand and thumb Wrist, thumb   Pain Quality Aching, throbbing and Sharp Mainly numbness, tingling, itchy   Frequency intermittent      Pain is worst  daytime    Exacerbated by  gripping, twisting, pinching, pulling, lifting    Relieved by NSAIDs and rest elevating   Progression Gradually worsening improving     VISUAL INSPECTION:  DATE 3/9/2021    Right   General posture of the upper extremity:  somewhat guarded, encouraged to no use sling   Joint alignment: [x]   Normal   []  Comments:   Other observations:  Incision areas to radial wrist - covered with steristrips. No evidence of infection.      Sensation   Still has decreased sensation to R thumb tip, radial thumb, volar radial hand. Does have sensation to ulnar side of thumb.    Edema   mild    ROM  Pain Report: - none  + mild    ++ moderate    +++ severe   Thumb   3/12/2020 3/12/2020 3/9/2021   4/6/21 4/14/21 4/20/21 5/11/21 5/27/21   AROM  (PROM) Right Left R R R R R R    MP 60 53 NT 20 45 35     IP 60 60 ~45 50  55     RABD 45+ 50+ NT 35  38     PABD 45+ 50+ NT 40  45     Opposition 7/10+++ 7/10++ NT 3/10  7/10+ 8/10 9/10     Wrist 3/12/2020 3/12/2020 3/9/2021   4/6/21 4/14/21 4/27/21 5/11/21   AROM (PROM) Right Left R R R R R   Extension 70+ 75 NT 40 60 70 70   Flexion 65+ 65+ NT 32 45 50 65   RD 20+ 25 NT 15 15 20 20   UD 45+ 45 NT 20 25 30 40   UD with Th Flex 20++ 25+ NT       Supination    65      Pronation    90        Strength   Painfree (Measured in pounds)  Pain Report:  + mild    ++ moderate    +++ severe    4/20/2021 4/20/2021 5/11/21 5/27/21 6/9/21   Trials Left Right Right Right Right   1  2  3 31  34  32 14 49  49  41 46  54  46 53  54  53   Average 32  47 49 53     Lat Pinch 4/20/2021 4/20/2021 5/11/21 5/27/21 6/9/21   Trials Left Right Right Right Right   1  2  3 7 0 9 10 12   Average          3 Pt Pinch 4/20/2021 4/20/2021 5/11/21 5/27/21 6/9/21   Trials Left Right Right Right Right   1  2  3 1+++ 2 8+ wrist 10+ 11+   Average          Palpation: mild tenderness at radial wrist    Please refer to the daily flowsheet for treatment provided today.       Plan:  Frequency/Duration:  Recommend continuing with the current treatment plan. 1 X week, once daily  for 7 weeks (total of 16 visits)    HEP:  AROM of wrist and thumb   and pinch strengthening  Desensitization  Wrist strengthening    Next Visit:   MFR/ scar massage  A/PROM of thumb and wrist  Strengthening  Job simulation activities

## 2021-06-16 ENCOUNTER — THERAPY VISIT (OUTPATIENT)
Dept: OCCUPATIONAL THERAPY | Facility: CLINIC | Age: 61
End: 2021-06-16
Payer: COMMERCIAL

## 2021-06-16 DIAGNOSIS — M79.644 THUMB PAIN, RIGHT: ICD-10-CM

## 2021-06-16 PROCEDURE — 97140 MANUAL THERAPY 1/> REGIONS: CPT | Mod: GO | Performed by: OCCUPATIONAL THERAPIST

## 2021-06-16 PROCEDURE — 97110 THERAPEUTIC EXERCISES: CPT | Mod: GO | Performed by: OCCUPATIONAL THERAPIST

## 2021-06-24 ENCOUNTER — THERAPY VISIT (OUTPATIENT)
Dept: OCCUPATIONAL THERAPY | Facility: CLINIC | Age: 61
End: 2021-06-24
Payer: COMMERCIAL

## 2021-06-24 DIAGNOSIS — M79.644 THUMB PAIN, RIGHT: ICD-10-CM

## 2021-06-24 DIAGNOSIS — M65.4 DE QUERVAIN'S DISEASE (RADIAL STYLOID TENOSYNOVITIS): ICD-10-CM

## 2021-06-24 DIAGNOSIS — M18.0 ARTHRITIS OF CARPOMETACARPAL (CMC) JOINTS OF BOTH THUMBS: Primary | ICD-10-CM

## 2021-06-24 PROCEDURE — 97530 THERAPEUTIC ACTIVITIES: CPT | Mod: GO | Performed by: OCCUPATIONAL THERAPIST

## 2021-06-24 PROCEDURE — 97140 MANUAL THERAPY 1/> REGIONS: CPT | Mod: GO | Performed by: OCCUPATIONAL THERAPIST

## 2021-06-24 PROCEDURE — 97110 THERAPEUTIC EXERCISES: CPT | Mod: GO | Performed by: OCCUPATIONAL THERAPIST

## 2021-06-24 NOTE — PROGRESS NOTES
SOAP Note - Hand Therapy - Objective Information    Current Date:  6/24/2021    Diagnosis:   1.  Right de Quervain's tendinitis.  2.  Right thumb carpometacarpal joint arthritis.    DOS: 3/1/21  Procedure:    1.  Open release of right wrist first dorsal compartment.  2.  Right wrist trapeziectomy and flexor carpi radialis ligament reconstruction and tendon interposition for arthroplasty.  Post:  3 months    Bre Bush is a 61 year old right hand dominant female.    Occupational Profile Information:  Current occupation is RN [employer]    S:  Subjective changes as noted by patient: The hand, wrist and thumb are taking its toll since I need to use the hand for weight bearing after my knee surgery. The thumb is tight and uncomfortable.  Functional changes noted by patient: Using hand more since knee surgery (crutches)      O:  Pain Level (Scale 0-10):   3/12/2020 3/9/2021   4/6/21 4/20/21 4/27/21 5/11/21 5/27/21 6/24/21   At Rest 4-5/10 Mainly numbness, tingling 3/10        With Use 7/10 Less than 5 (at worst recently) 4-5/10 5/10 5/10 5/10 2/10 2/10     Pain Description:  Date 3/12/2020 3/9/2021     Location hand and thumb Wrist, thumb   Pain Quality Aching, throbbing and Sharp Mainly numbness, tingling, itchy   Frequency intermittent      Pain is worst  daytime    Exacerbated by  gripping, twisting, pinching, pulling, lifting    Relieved by NSAIDs and rest elevating   Progression Gradually worsening improving     VISUAL INSPECTION:  DATE 3/9/2021    Right   General posture of the upper extremity:  somewhat guarded, encouraged to no use sling   Joint alignment: [x]   Normal   []  Comments:   Other observations:  Incision areas to radial wrist - covered with steristrips. No evidence of infection.      Sensation   Still has decreased sensation to R thumb tip, radial thumb, volar radial hand. Does have sensation to ulnar side of thumb.    Edema   mild    ROM  Pain Report: - none  + mild    ++ moderate    +++ severe    Thumb   3/12/2020 3/12/2020 3/9/2021   4/6/21 4/14/21 4/20/21 5/11/21 5/27/21   AROM  (PROM) Right Left R R R R R R   MP 60 53 NT 20 45 35     IP 60 60 ~45 50  55     RABD 45+ 50+ NT 35  38     PABD 45+ 50+ NT 40  45     Opposition 7/10+++ 7/10++ NT 3/10  7/10+ 8/10 9/10     Wrist 3/12/2020 3/12/2020 3/9/2021   4/6/21 4/14/21 4/27/21 5/11/21 6/24/21   AROM (PROM) Right Left R R R R R R   Extension 70+ 75 NT 40 60 70 70 75   Flexion 65+ 65+ NT 32 45 50 65 65   RD 20+ 25 NT 15 15 20 20 25   UD 45+ 45 NT 20 25 30 40 45   UD with Th Flex 20++ 25+ NT        Supination    65       Pronation    90         Strength   Painfree (Measured in pounds)  Pain Report:  + mild    ++ moderate    +++ severe    4/20/2021 4/20/2021 5/11/21 5/27/21 6/9/21   Trials Left Right Right Right Right   1  2  3 31  34  32 14 49  49  41 46  54  46 53  54  53   Average 32  47 49 53     Lat Pinch 4/20/2021 4/20/2021 5/11/21 5/27/21 6/9/21   Trials Left Right Right Right Right   1  2  3 7 0 9 10 12   Average          3 Pt Pinch 4/20/2021 4/20/2021 5/11/21 5/27/21 6/9/21   Trials Left Right Right Right Right   1  2  3 1+++ 2 8+ wrist 10+ 11+   Average          Palpation: mild tenderness at radial wrist    Please refer to the daily flowsheet for treatment provided today.       Plan:  Frequency/Duration:  Recommend continuing with the current treatment plan. 1 X week, once daily  for 7 weeks (total of 16 visits)    HEP:  AROM of wrist and thumb   and pinch strengthening  Desensitization  Wrist strengthening    Next Visit:   MFR/ scar massage  A/PROM of thumb and wrist  Strengthening  Job simulation activities

## 2021-07-01 ENCOUNTER — THERAPY VISIT (OUTPATIENT)
Dept: OCCUPATIONAL THERAPY | Facility: CLINIC | Age: 61
End: 2021-07-01
Payer: COMMERCIAL

## 2021-07-01 DIAGNOSIS — M65.4 DE QUERVAIN'S DISEASE (RADIAL STYLOID TENOSYNOVITIS): ICD-10-CM

## 2021-07-01 DIAGNOSIS — M79.644 THUMB PAIN, RIGHT: ICD-10-CM

## 2021-07-01 DIAGNOSIS — M18.0 ARTHRITIS OF CARPOMETACARPAL (CMC) JOINTS OF BOTH THUMBS: Primary | ICD-10-CM

## 2021-07-01 PROCEDURE — 97140 MANUAL THERAPY 1/> REGIONS: CPT | Mod: GO | Performed by: OCCUPATIONAL THERAPIST

## 2021-07-01 PROCEDURE — 97110 THERAPEUTIC EXERCISES: CPT | Mod: GO | Performed by: OCCUPATIONAL THERAPIST

## 2021-07-01 PROCEDURE — 97530 THERAPEUTIC ACTIVITIES: CPT | Mod: GO | Performed by: OCCUPATIONAL THERAPIST

## 2021-07-15 ENCOUNTER — THERAPY VISIT (OUTPATIENT)
Dept: OCCUPATIONAL THERAPY | Facility: CLINIC | Age: 61
End: 2021-07-15
Payer: COMMERCIAL

## 2021-07-15 DIAGNOSIS — M65.4 DE QUERVAIN'S DISEASE (RADIAL STYLOID TENOSYNOVITIS): Primary | ICD-10-CM

## 2021-07-15 DIAGNOSIS — M18.0 ARTHRITIS OF CARPOMETACARPAL (CMC) JOINTS OF BOTH THUMBS: ICD-10-CM

## 2021-07-15 DIAGNOSIS — M79.644 THUMB PAIN, RIGHT: ICD-10-CM

## 2021-07-15 PROCEDURE — 97140 MANUAL THERAPY 1/> REGIONS: CPT | Mod: GO | Performed by: OCCUPATIONAL THERAPIST

## 2021-07-15 PROCEDURE — 97035 APP MDLTY 1+ULTRASOUND EA 15: CPT | Mod: GO | Performed by: OCCUPATIONAL THERAPIST

## 2021-07-15 PROCEDURE — 97110 THERAPEUTIC EXERCISES: CPT | Mod: GO | Performed by: OCCUPATIONAL THERAPIST

## 2021-07-15 NOTE — PROGRESS NOTES
SOAP Note - Hand Therapy - Objective Information    Current Date:  7/15/2021    Diagnosis:   1.  Right de Quervain's tendinitis.  2.  Right thumb carpometacarpal joint arthritis.    DOS: 3/1/21  Procedure:    1.  Open release of right wrist first dorsal compartment.  2.  Right wrist trapeziectomy and flexor carpi radialis ligament reconstruction and tendon interposition for arthroplasty.  Post:  3 months    Bre Bush is a 61 year old right hand dominant female.    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 Having more pain in the middle joint of the thumb  Functional changes noted by patient:      O:  Pain Level (Scale 0-10):   3/12/2020 3/9/2021   4/6/21 4/20/21 4/27/21 5/11/21 5/27/21 6/24/21 7/15/21   At Rest 4-5/10 Mainly numbness, tingling 3/10         With Use 7/10 Less than 5 (at worst recently) 4-5/10 5/10 5/10 5/10 2/10 2/10 2-3/10 (wrist and thumb)     Pain Description:  Date 3/12/2020 3/9/2021   7/15/21   Location hand and thumb Wrist, thumb Thumb   Pain Quality Aching, throbbing and Sharp Mainly numbness, tingling, itchy aching   Frequency intermittent       Pain is worst  daytime     Exacerbated by  gripping, twisting, pinching, pulling, lifting     Relieved by NSAIDs and rest elevating    Progression Gradually worsening improving      VISUAL INSPECTION:  DATE 3/9/2021    Right   General posture of the upper extremity:  somewhat guarded, encouraged to no use sling   Joint alignment: [x]   Normal   []  Comments:   Other observations:  Incision areas to radial wrist - covered with steristrips. No evidence of infection.      Sensation   Still has decreased sensation to R thumb tip, radial thumb, volar radial hand. Does have sensation to ulnar side of thumb.    Edema   mild    ROM  Pain Report: - none  + mild    ++ moderate    +++ severe   Thumb   3/12/2020 3/12/2020 3/9/2021   4/6/21  4/14/21 4/20/21 5/11/21 5/27/21   AROM  (PROM) Right Left R R R R R R   MP 60 53 NT 20 45 35     IP 60 60 ~45 50  55     RABD 45+ 50+ NT 35  38     PABD 45+ 50+ NT 40  45     Opposition 7/10+++ 7/10++ NT 3/10  7/10+ 8/10 9/10     Wrist 3/12/2020 3/12/2020 3/9/2021   4/6/21 4/14/21 4/27/21 5/11/21 6/24/21   AROM (PROM) Right Left R R R R R R   Extension 70+ 75 NT 40 60 70 70 75   Flexion 65+ 65+ NT 32 45 50 65 65   RD 20+ 25 NT 15 15 20 20 25   UD 45+ 45 NT 20 25 30 40 45   UD with Th Flex 20++ 25+ NT        Supination    65       Pronation    90         Strength   Painfree (Measured in pounds)  Pain Report:  + mild    ++ moderate    +++ severe    4/20/2021 4/20/2021 5/11/21 5/27/21 6/9/21 7/15/21   Trials Left Right Right Right Right Right   1  2  3 31  34  32 14 49  49  41 46  54  46 53  54  53 58  60  54   Average 32  47 49 53 57     Lat Pinch 4/20/2021 4/20/2021 5/11/21 5/27/21 6/9/21 7/15/21   Trials Left Right Right Right Right Right   1  2  3 7 0 9 10 12 12   Average           3 Pt Pinch 4/20/2021 4/20/2021 5/11/21 5/27/21 6/9/21 7/15/21   Trials Left Right Right Right Right Right   1  2  3 1+++ 2 8+ wrist 10+ 11+ 11   Average           MMT  Pain Report:  + mild    ++ moderate    +++ severe   Wrist 7/15/2021    Right   Extension 5/5   Flexion 5/5   RD 5/5 pain   UD 5/5   Supination 5/5   Pronation 5/5     Resisted Testing  Pain Report: - none  + mild    ++ moderate    +++ severe    7/15/2021   APL ++ MCP   EPB ++ MCP   EPL ++ MCP   FCR -     Palpation: mild tenderness at radial wrist    Please refer to the daily flowsheet for treatment provided today.       Plan:  Frequency/Duration:  Recommend continuing with the current treatment plan. 1 X week, once daily  for 7 weeks (total of 16 visits)    HEP:  AROM of wrist and thumb   and pinch strengthening  Desensitization  Wrist strengthening    Next Visit:   MFR/ scar massage  A/PROM of thumb and wrist  Strengthening  Job simulation activities

## 2021-07-29 ENCOUNTER — THERAPY VISIT (OUTPATIENT)
Dept: OCCUPATIONAL THERAPY | Facility: CLINIC | Age: 61
End: 2021-07-29
Payer: COMMERCIAL

## 2021-07-29 DIAGNOSIS — M18.0 ARTHRITIS OF CARPOMETACARPAL (CMC) JOINTS OF BOTH THUMBS: ICD-10-CM

## 2021-07-29 DIAGNOSIS — M65.4 DE QUERVAIN'S DISEASE (RADIAL STYLOID TENOSYNOVITIS): Primary | ICD-10-CM

## 2021-07-29 DIAGNOSIS — M79.644 THUMB PAIN, RIGHT: ICD-10-CM

## 2021-07-29 PROCEDURE — 97110 THERAPEUTIC EXERCISES: CPT | Mod: GO | Performed by: OCCUPATIONAL THERAPIST

## 2021-07-29 PROCEDURE — 97140 MANUAL THERAPY 1/> REGIONS: CPT | Mod: GO | Performed by: OCCUPATIONAL THERAPIST

## 2021-07-29 NOTE — PROGRESS NOTES
Discharge Note - Hand Therapy     Current Date:  7/29/2021  Initial Evaluation Date:  3/12/21  Reporting period is from 5/27/21 to 7/29/2021  Number of Visits:  16    Diagnosis:   1.  Right de Quervain's tendinitis.  2.  Right thumb carpometacarpal joint arthritis.    DOS: 3/1/21  Procedure:    1.  Open release of right wrist first dorsal compartment.  2.  Right wrist trapeziectomy and flexor carpi radialis ligament reconstruction and tendon interposition for arthroplasty.  Post:  4 months    Bre Bush is a 61 year old right hand dominant female.    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 More pain with repetitive motions (3/10)  Functional changes noted by patient:    Upper Extremity Functional Index Score:  SCORE:   Column Totals: /80: 56   (A lower score indicates greater disability.)      O:  Pain Level (Scale 0-10):   3/12/2020 3/9/2021   4/6/21 4/20/21 4/27/21 5/11/21 5/27/21 6/24/21 7//21   At Rest 4-5/10 Mainly numbness, tingling 3/10         With Use 7/10 Less than 5 (at worst recently) 4-5/10 5/10 5/10 5/10 2/10 2/10 2-3/10 (wrist and thumb)     Pain Description:  Date 3/12/2020 3/9/2021   7/15/21   Location hand and thumb Wrist, thumb Thumb   Pain Quality Aching, throbbing and Sharp Mainly numbness, tingling, itchy aching   Frequency intermittent       Pain is worst  daytime     Exacerbated by  gripping, twisting, pinching, pulling, lifting     Relieved by NSAIDs and rest elevating    Progression Gradually worsening improving      VISUAL INSPECTION:  DATE 3/9/2021    Right   General posture of the upper extremity:  somewhat guarded, encouraged to no use sling   Joint alignment: [x]   Normal   []  Comments:   Other observations:  Incision areas to radial wrist - covered with steristrips. No evidence of infection.      Sensation   Still has decreased sensation to R thumb tip, radial  thumb, volar radial hand. Does have sensation to ulnar side of thumb.    Edema   mild    ROM  Pain Report: - none  + mild    ++ moderate    +++ severe   Thumb   3/12/2020 3/12/2020 3/9/2021   4/6/21 4/14/21 4/20/21 5/11/21 5/27/21 7/29/21   AROM  (PROM) Right Left R R R R R R    MP 60 53 NT 20 45 35      IP 60 60 ~45 50  55      RABD 45+ 50+ NT 35  38      PABD 45+ 50+ NT 40  45      Opposition 7/10+++ 7/10++ NT 3/10  7/10+ 8/10 9/10 10/10     Wrist 3/12/2020 3/12/2020 3/9/2021   4/6/21 4/14/21 4/27/21 5/11/21 6/24/21   AROM (PROM) Right Left R R R R R R   Extension 70+ 75 NT 40 60 70 70 75   Flexion 65+ 65+ NT 32 45 50 65 65   RD 20+ 25 NT 15 15 20 20 25   UD 45+ 45 NT 20 25 30 40 45   UD with Th Flex 20++ 25+ NT        Supination    65       Pronation    90         Strength   Painfree (Measured in pounds)  Pain Report:  + mild    ++ moderate    +++ severe    4/20/2021 4/20/2021 5/11/21 5/27/21 6/9/21 7/15/21 7/29/21   Trials Left Right Right Right Right Right Right   1  2  3 31  34  32 14 49  49  41 46  54  46 53  54  53 58  60  54 62  61  58   Average 32  47 49 53 57 60     Lat Pinch 4/20/2021 4/20/2021 5/11/21 5/27/21 6/9/21 7/15/21 7/29/21   Trials Left Right Right Right Right Right Right   1  2  3 7 0 9 10 12 12 11   Average            3 Pt Pinch 4/20/2021 4/20/2021 5/11/21 5/27/21 6/9/21 7/15/21 7/29/21   Trials Left Right Right Right Right Right Right   1  2  3 1+++ 2 8+ wrist 10+ 11+ 11 10+   Average            MMT  Pain Report:  + mild    ++ moderate    +++ severe   Wrist 7/15/2021    Right   Extension 5/5   Flexion 5/5   RD 5/5 pain   UD 5/5   Supination 5/5   Pronation 5/5     Resisted Testing  Pain Report: - none  + mild    ++ moderate    +++ severe    7/15/2021 7/29/21   APL ++ MCP +  ++   EPB ++ MCP + to ++   EPL ++ MCP ++   FCR -      Palpation: mild tenderness at radial wrist    Please refer to the daily flowsheet for treatment provided today.     Assessment:  Overall Assessment:  Patient is  progressing well.  Patient is independent in home exercise program.  Patient is ready to be discharged from therapy and continue their home treatment program.  STG/LTG:  See goal sheet for details and updates.    Plan:  Frequency/Duration:  Discharge from Hand Therapy; continue home program.    Recommendations for Home Program -   HEP:  AROM of wrist and thumb   and pinch strengthening  Desensitization  Wrist strengthening

## 2021-08-11 ENCOUNTER — PRE VISIT (OUTPATIENT)
Dept: ORTHOPEDICS | Facility: CLINIC | Age: 61
End: 2021-08-11

## 2021-10-23 ENCOUNTER — HEALTH MAINTENANCE LETTER (OUTPATIENT)
Age: 61
End: 2021-10-23

## 2022-02-12 ENCOUNTER — HEALTH MAINTENANCE LETTER (OUTPATIENT)
Age: 62
End: 2022-02-12

## 2022-04-08 NOTE — TELEPHONE ENCOUNTER
DIAGNOSIS: L thumb Arthritis of carpometacarpal (CMC) De Quervain's disease (radial styloid tenosynovitis   APPOINTMENT DATE: 04/19/2022   NOTES STATUS DETAILS   OFFICE NOTE from referring provider Internal 05/11/2021 Dr Matthew [facility]    OFFICE NOTE from other specialist N/A    DISCHARGE SUMMARY from hospital N/A    DISCHARGE REPORT from the ER N/A    OPERATIVE REPORT Internal 03/01/2021 Right thumb trapeziectomy and arthroplasty, right first dorsal compartment release   MEDICATION LIST N/A    EMG (for Spine) N/A    IMPLANT RECORD/STICKER N/A    LABS     CBC/DIFF N/A    CULTURES N/A    INJECTIONS DONE IN RADIOLOGY N/A    MRI Internal 11/25/2021 LFT wrist   CT SCAN N/A    XRAYS (IMAGES & REPORTS) N/A    TUMOR     PATHOLOGY  Slides & report N/A

## 2022-04-19 ENCOUNTER — TELEPHONE (OUTPATIENT)
Dept: ORTHOPEDICS | Facility: CLINIC | Age: 62
End: 2022-04-19

## 2022-04-19 ENCOUNTER — OFFICE VISIT (OUTPATIENT)
Dept: ORTHOPEDICS | Facility: CLINIC | Age: 62
End: 2022-04-19
Payer: COMMERCIAL

## 2022-04-19 ENCOUNTER — PRE VISIT (OUTPATIENT)
Dept: ORTHOPEDICS | Facility: CLINIC | Age: 62
End: 2022-04-19

## 2022-04-19 ENCOUNTER — ANCILLARY PROCEDURE (OUTPATIENT)
Dept: GENERAL RADIOLOGY | Facility: CLINIC | Age: 62
End: 2022-04-19
Attending: PLASTIC SURGERY
Payer: COMMERCIAL

## 2022-04-19 ENCOUNTER — HOSPITAL ENCOUNTER (OUTPATIENT)
Facility: AMBULATORY SURGERY CENTER | Age: 62
End: 2022-04-19
Attending: PLASTIC SURGERY | Admitting: PLASTIC SURGERY
Payer: COMMERCIAL

## 2022-04-19 DIAGNOSIS — M18.12 PRIMARY OSTEOARTHRITIS OF FIRST CARPOMETACARPAL JOINT OF LEFT HAND: ICD-10-CM

## 2022-04-19 DIAGNOSIS — M18.12 PRIMARY OSTEOARTHRITIS OF FIRST CARPOMETACARPAL JOINT OF LEFT HAND: Primary | ICD-10-CM

## 2022-04-19 DIAGNOSIS — M79.645 PAIN OF LEFT THUMB: ICD-10-CM

## 2022-04-19 DIAGNOSIS — M79.645 PAIN OF LEFT THUMB: Primary | ICD-10-CM

## 2022-04-19 DIAGNOSIS — M18.0 ARTHRITIS OF CARPOMETACARPAL (CMC) JOINTS OF BOTH THUMBS: Primary | ICD-10-CM

## 2022-04-19 PROCEDURE — 73130 X-RAY EXAM OF HAND: CPT | Mod: LT | Performed by: RADIOLOGY

## 2022-04-19 PROCEDURE — 99214 OFFICE O/P EST MOD 30 MIN: CPT | Performed by: PLASTIC SURGERY

## 2022-04-19 RX ORDER — CEFAZOLIN SODIUM 2 G/50ML
2 SOLUTION INTRAVENOUS SEE ADMIN INSTRUCTIONS
Status: CANCELLED | OUTPATIENT
Start: 2022-04-19

## 2022-04-19 RX ORDER — CEFAZOLIN SODIUM 2 G/50ML
2 SOLUTION INTRAVENOUS
Status: CANCELLED | OUTPATIENT
Start: 2022-04-19

## 2022-04-19 NOTE — LETTER
4/19/2022         RE: Bre Bush  [address]  [facility] [address]        Dear Colleague,    Thank you for referring your patient, Bre Bush, to the [facility]. Please see a copy of my visit note below.    REFERRING PROVIDER:  Ha Will MD    PRESENTING COMPLAINT:  Consultation for left thumb pain.    HISTORY OF PRESENTING COMPLAINT:  Ms. Bush is 62 years old.  She is right-hand dominant.  She has been having pain at the base of her left thumb for many years.  She has used splints as well as steroid injections with little relief.  Now it is interfering with her day-to-day life and quality of work and life.  She had a right-sided trapeziectomy done in the past with relief.    PAST MEDICAL HISTORY:  Hypothyroidism, GERD.    PAST SURGICAL HISTORY:  Right trapeziectomy with LRTI and left knee surgery and right knee surgery.    MEDICATIONS:  Levothyroxine, Prevacid.    ALLERGIES:  Nil.    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    REVIEW OF SYSTEMS:  Denies chest pain, shortness of breath, MI, CVA, DVT and PE.    PHYSICAL EXAMINATION:  Vital signs are stable.  She is afebrile, in no obvious distress.  She has obvious changes in her left thumb CMC area consistent with arthritis.  Grind test is positive.    X-rays done recently show significant arthritis.    ASSESSMENT AND PLAN:  Based upon the above findings, a diagnosis of left thumb CMC joint arthritis interfering with her day-to-day activities was made.  I had a ting discussion with the patient about her findings.  Given the fact that conservative therapy is not working and it is interfering with her day-to-day life, I think surgery is the next option.  She has already had surgery to the right side with good relief.  I went over the planned procedure with the patient in detail.  All risks, benefits and alternatives including pain, infection, bleeding, scarring,  asymmetry, seromas, hematomas, wound breakdown, wound dehiscence, injury to deeper structures, CRPS, scar tenderness, inability to promise all her symptoms will go away, weakness of the thumb, DVT, PE, MI, CVA, pneumonia and death were explained.  She understood them all and wants to proceed.  We will schedule scheduling as soon as possible.  Plan will be a trapeziectomy and APL tendon suspensionplasty.  All questions were answered.  She was happy with the visit.  All exam and discussion done in presence of my resident, Ben Ruano.    Total time spent with chart review, visit itself and post-visit paperwork was 45 minutes.    LUPE Renteria MD    cc:  Ha Will MD  [address], [address]  [place] [address]          Again, thank you for allowing me to participate in the care of your patient.        Sincerely,        WALDEMRA Renteria MD

## 2022-04-19 NOTE — TELEPHONE ENCOUNTER
Procedure: Left 1st CMC arthroplasty, trapeziectomy, suture suspensionplasty vs LRTI  [place] [facility] [place]  Length: ? minutes  Anesthesia: Choice  Post-op appointments needed: 1-2 provider only, 6 weeks with provider only.  Surgery packet/instructions given to patient?  Yes     Jack Read RN

## 2022-04-19 NOTE — TELEPHONE ENCOUNTER
Patient scheduled for 11/4 per pt request.    Will schedule COVID test if still needed come October.    H&P with PCP.    Post-op scheduled for 11/15 and 12/13.    PT needs OT scheduled for 11/15 - at the same time as post-op.     Surg packet given in clinic.

## 2022-04-19 NOTE — PROGRESS NOTES
REFERRING PROVIDER:  Ha Will MD    PRESENTING COMPLAINT:  Consultation for left thumb pain.    HISTORY OF PRESENTING COMPLAINT:  Ms. Bush is 62 years old.  She is right-hand dominant.  She has been having pain at the base of her left thumb for many years.  She has used splints as well as steroid injections with little relief.  Now it is interfering with her day-to-day life and quality of work and life.  She had a right-sided trapeziectomy done in the past with relief.    PAST MEDICAL HISTORY:  Hypothyroidism, GERD.    PAST SURGICAL HISTORY:  Right trapeziectomy with LRTI and left knee surgery and right knee surgery.    MEDICATIONS:  Levothyroxine, Prevacid.    ALLERGIES:  Nil.    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  Lives in [place].    REVIEW OF SYSTEMS:  Denies chest pain, shortness of breath, MI, CVA, DVT and PE.    PHYSICAL EXAMINATION:  Vital signs are stable.  She is afebrile, in no obvious distress.  She has obvious changes in her left thumb CMC area consistent with arthritis.  Grind test is positive.    X-rays done recently show significant arthritis.    ASSESSMENT AND PLAN:  Based upon the above findings, a diagnosis of left thumb CMC joint arthritis interfering with her day-to-day activities was made.  I had a ting discussion with the patient about her findings.  Given the fact that conservative therapy is not working and it is interfering with her day-to-day life, I think surgery is the next option.  She has already had surgery to the right side with good relief.  I went over the planned procedure with the patient in detail.  All risks, benefits and alternatives including pain, infection, bleeding, scarring, asymmetry, seromas, hematomas, wound breakdown, wound dehiscence, injury to deeper structures, CRPS, scar tenderness, inability to promise all her symptoms will go away, weakness of the thumb, DVT, PE, MI, CVA, pneumonia and death were explained.  She understood  them all and wants to proceed.  We will schedule scheduling as soon as possible.  Plan will be a trapeziectomy and APL tendon suspensionplasty.  All questions were answered.  She was happy with the visit.  All exam and discussion done in presence of my resident, Ben Ruano.    Total time spent with chart review, visit itself and post-visit paperwork was 45 minutes.    LUPE Renteria MD    cc:  Ha Will MD  [address], [address]  [place] [id number]

## 2022-04-19 NOTE — NURSING NOTE
Pre-Operative Teaching Flowsheet     Person(s) involved in teaching: Patient     Motivation Level:  Receptive (willing/able to accept information) and asks appropriate questions where applicable: Yes  Any cultural factors/Sikh beliefs that may influence understanding or compliance? No     Patient demonstrates understanding of the following:  Pre-operative planning, including the necessary appointments and preparation needed prior to surgery: Yes  Which situations necessitate calling provider and whom to contact: Yes  Pain management techniques pre and post op: Yes  How, and when, to access community resources: Yes    Additional Teaching Concerns Addressed:  Post-operative living arrangements and necessary adaptations to living environment.     Instructional Materials Used/Given: Yes, pre-op packet printed from [employer] with additional system forms added (COVID Testing Handout, Map, etc). Pre-op soap given (if in clinic).     Time spent with patient: 20 minutes.    Jack Read RN

## 2022-04-20 NOTE — TELEPHONE ENCOUNTER
4/20 Called and left voicemail. Provided phone number [phone] to schedule hand therapy after post op appointments with Dr. Renteria.     Debbie rutherford Procedure   Orthopedics, Podiatry, Sports Medicine, ENT/Eye Specialties  [facility]   [phone]

## 2022-07-19 NOTE — TELEPHONE ENCOUNTER
7/19 2nd attempt.  Called and left voicemail. Provided phone number [phone] to schedule hand therapy after post op appointments with Dr. Renteria on 11/15 .      Debbie rutherford Procedure   Orthopedics, Podiatry, Sports Medicine, ENT/Eye Specialties  [facility]   [phone]    Letter sent to [place].    Jayna Dominguez CMA

## 2022-10-10 ENCOUNTER — HEALTH MAINTENANCE LETTER (OUTPATIENT)
Age: 62
End: 2022-10-10

## 2023-02-18 ENCOUNTER — HEALTH MAINTENANCE LETTER (OUTPATIENT)
Age: 63
End: 2023-02-18

## 2024-03-16 ENCOUNTER — HEALTH MAINTENANCE LETTER (OUTPATIENT)
Age: 64
End: 2024-03-16

## 2024-08-03 ENCOUNTER — HEALTH MAINTENANCE LETTER (OUTPATIENT)
Age: 64
End: 2024-08-03

## 2025-03-22 ENCOUNTER — HEALTH MAINTENANCE LETTER (OUTPATIENT)
Age: 65
End: 2025-03-22

## (undated) DEVICE — PACK NEURO MINOR UMMC SNE32MNMU4

## (undated) DEVICE — CAST PADDING 4" COTTON SPECIALIST 100 UNSTERILE 7054

## (undated) DEVICE — SOL NACL 0.9% IRRIG 1000ML BOTTLE 2F7124

## (undated) DEVICE — BLADE KNIFE SURG 15 371115

## (undated) DEVICE — DRAPE STOCKINETTE 6" 8564

## (undated) DEVICE — ESU GROUND PAD ADULT W/CORD E7507

## (undated) DEVICE — BNDG ESMARK 4" STERILE LF 820-3412

## (undated) DEVICE — DRSG XEROFORM 5X9" CUR253590W

## (undated) DEVICE — BNDG ELASTIC 3"X5YDS UNSTERILE 6611-30

## (undated) DEVICE — PAD CHUX UNDERPAD 23X24" 7136

## (undated) DEVICE — CAST PLASTER SPLINT 4X15" 7394

## (undated) DEVICE — ESU FCP BIPOLAR NONSTICK STR 4"X0.4MM W/CORD 19-3002AU

## (undated) DEVICE — LINEN TOWEL PACK X5 5464

## (undated) DEVICE — SU VICRYL 2-0 SH 27" UND J417H

## (undated) DEVICE — DRAPE POUCH INSTRUMENT 1018

## (undated) DEVICE — TOURNIQUET CUFF 18" REPRO RED 60-7070-103

## (undated) DEVICE — BNDG ELASTIC 4"X5YDS UNSTERILE 6611-40

## (undated) DEVICE — SLING ARM LG 79-99157

## (undated) DEVICE — SU MONOCRYL 4-0 PS-2 27" UND Y426H

## (undated) DEVICE — BNDG ELASTIC 2"X5YDS UNSTERILE 6611-20

## (undated) DEVICE — LINEN TOWEL PACK X6 WHITE 5487

## (undated) DEVICE — SOL WATER IRRIG 1000ML BOTTLE 2F7114

## (undated) DEVICE — SU ETHIBOND 3-0 BBDA 36" X588H

## (undated) DEVICE — DRSG GAUZE 4X4" TRAY 6939

## (undated) DEVICE — SU ETHILON 4-0 PC-3 18" 1864G

## (undated) DEVICE — CAST PADDING 4" COTTON WEBRIL STERILE 9084S

## (undated) DEVICE — BUR STRK CARBIDE ROUND 4.0MM 5820-110-040C

## (undated) DEVICE — DRAPE C-ARM MINI 5423

## (undated) RX ORDER — LIDOCAINE HYDROCHLORIDE AND EPINEPHRINE 10; 10 MG/ML; UG/ML
INJECTION, SOLUTION INFILTRATION; PERINEURAL
Status: DISPENSED
Start: 2021-03-01

## (undated) RX ORDER — MINERAL OIL
OIL (ML) MISCELLANEOUS
Status: DISPENSED
Start: 2021-03-01

## (undated) RX ORDER — FENTANYL CITRATE 50 UG/ML
INJECTION, SOLUTION INTRAMUSCULAR; INTRAVENOUS
Status: DISPENSED
Start: 2021-03-01

## (undated) RX ORDER — CEFAZOLIN SODIUM 1 G/3ML
INJECTION, POWDER, FOR SOLUTION INTRAMUSCULAR; INTRAVENOUS
Status: DISPENSED
Start: 2021-03-01

## (undated) RX ORDER — PROPOFOL 10 MG/ML
INJECTION, EMULSION INTRAVENOUS
Status: DISPENSED
Start: 2021-03-01

## (undated) RX ORDER — CEFAZOLIN SODIUM 2 G/100ML
INJECTION, SOLUTION INTRAVENOUS
Status: DISPENSED
Start: 2021-03-01

## (undated) RX ORDER — ONDANSETRON 2 MG/ML
INJECTION INTRAMUSCULAR; INTRAVENOUS
Status: DISPENSED
Start: 2021-03-01